# Patient Record
Sex: MALE | Race: BLACK OR AFRICAN AMERICAN | NOT HISPANIC OR LATINO | Employment: UNEMPLOYED | URBAN - METROPOLITAN AREA
[De-identification: names, ages, dates, MRNs, and addresses within clinical notes are randomized per-mention and may not be internally consistent; named-entity substitution may affect disease eponyms.]

---

## 2018-06-19 ENCOUNTER — APPOINTMENT (OUTPATIENT)
Dept: GENERAL RADIOLOGY | Facility: CLINIC | Age: 48
End: 2018-06-19
Attending: EMERGENCY MEDICINE
Payer: COMMERCIAL

## 2018-06-19 ENCOUNTER — HOSPITAL ENCOUNTER (EMERGENCY)
Facility: CLINIC | Age: 48
Discharge: HOME | End: 2018-06-20
Attending: EMERGENCY MEDICINE | Admitting: EMERGENCY MEDICINE
Payer: COMMERCIAL

## 2018-06-19 DIAGNOSIS — R10.13 ABDOMINAL PAIN, EPIGASTRIC: ICD-10-CM

## 2018-06-19 LAB
ALBUMIN UR-MCNC: NEGATIVE MG/DL
AMMONIA PLAS-SCNC: 69 UMOL/L (ref 10–50)
APPEARANCE UR: CLEAR
BASOPHILS # BLD AUTO: 0 10E9/L (ref 0–0.2)
BASOPHILS NFR BLD AUTO: 0.2 %
BILIRUB UR QL STRIP: NEGATIVE
COLOR UR AUTO: ABNORMAL
DIFFERENTIAL METHOD BLD: NORMAL
EOSINOPHIL # BLD AUTO: 0.1 10E9/L (ref 0–0.7)
EOSINOPHIL NFR BLD AUTO: 2.1 %
ERYTHROCYTE [DISTWIDTH] IN BLOOD BY AUTOMATED COUNT: 12.8 % (ref 10–15)
GLUCOSE UR STRIP-MCNC: NEGATIVE MG/DL
HCT VFR BLD AUTO: 40.1 % (ref 40–53)
HGB BLD-MCNC: 13.4 G/DL (ref 13.3–17.7)
HGB UR QL STRIP: NEGATIVE
IMM GRANULOCYTES # BLD: 0 10E9/L (ref 0–0.4)
IMM GRANULOCYTES NFR BLD: 0.2 %
KETONES UR STRIP-MCNC: NEGATIVE MG/DL
LEUKOCYTE ESTERASE UR QL STRIP: NEGATIVE
LYMPHOCYTES # BLD AUTO: 2.3 10E9/L (ref 0.8–5.3)
LYMPHOCYTES NFR BLD AUTO: 41.3 %
MCH RBC QN AUTO: 28.3 PG (ref 26.5–33)
MCHC RBC AUTO-ENTMCNC: 33.4 G/DL (ref 31.5–36.5)
MCV RBC AUTO: 85 FL (ref 78–100)
MONOCYTES # BLD AUTO: 0.4 10E9/L (ref 0–1.3)
MONOCYTES NFR BLD AUTO: 7.6 %
MUCOUS THREADS #/AREA URNS LPF: PRESENT /LPF
NEUTROPHILS # BLD AUTO: 2.8 10E9/L (ref 1.6–8.3)
NEUTROPHILS NFR BLD AUTO: 48.6 %
NITRATE UR QL: NEGATIVE
NRBC # BLD AUTO: 0 10*3/UL
NRBC BLD AUTO-RTO: 0 /100
PH UR STRIP: 6.5 PH (ref 5–7)
PLATELET # BLD AUTO: 179 10E9/L (ref 150–450)
RBC # BLD AUTO: 4.73 10E12/L (ref 4.4–5.9)
RBC #/AREA URNS AUTO: <1 /HPF (ref 0–2)
SOURCE: ABNORMAL
SP GR UR STRIP: 1.01 (ref 1–1.03)
UROBILINOGEN UR STRIP-MCNC: NORMAL MG/DL (ref 0–2)
WBC # BLD AUTO: 5.7 10E9/L (ref 4–11)
WBC #/AREA URNS AUTO: 1 /HPF (ref 0–5)

## 2018-06-19 PROCEDURE — 84443 ASSAY THYROID STIM HORMONE: CPT | Performed by: EMERGENCY MEDICINE

## 2018-06-19 PROCEDURE — 71045 X-RAY EXAM CHEST 1 VIEW: CPT

## 2018-06-19 PROCEDURE — 85025 COMPLETE CBC W/AUTO DIFF WBC: CPT | Performed by: EMERGENCY MEDICINE

## 2018-06-19 PROCEDURE — 80053 COMPREHEN METABOLIC PANEL: CPT | Performed by: EMERGENCY MEDICINE

## 2018-06-19 PROCEDURE — 84484 ASSAY OF TROPONIN QUANT: CPT | Performed by: EMERGENCY MEDICINE

## 2018-06-19 PROCEDURE — 83690 ASSAY OF LIPASE: CPT | Performed by: EMERGENCY MEDICINE

## 2018-06-19 PROCEDURE — 99284 EMERGENCY DEPT VISIT MOD MDM: CPT | Mod: Z6 | Performed by: EMERGENCY MEDICINE

## 2018-06-19 PROCEDURE — 87086 URINE CULTURE/COLONY COUNT: CPT | Performed by: EMERGENCY MEDICINE

## 2018-06-19 PROCEDURE — 99285 EMERGENCY DEPT VISIT HI MDM: CPT | Mod: 25 | Performed by: EMERGENCY MEDICINE

## 2018-06-19 PROCEDURE — 81001 URINALYSIS AUTO W/SCOPE: CPT | Performed by: EMERGENCY MEDICINE

## 2018-06-19 PROCEDURE — 82140 ASSAY OF AMMONIA: CPT | Performed by: EMERGENCY MEDICINE

## 2018-06-19 ASSESSMENT — ENCOUNTER SYMPTOMS
EYE REDNESS: 0
HEADACHES: 0
NAUSEA: 0
SHORTNESS OF BREATH: 0
ABDOMINAL PAIN: 1
DIFFICULTY URINATING: 0
FEVER: 0
VOMITING: 0
ARTHRALGIAS: 0
FATIGUE: 1
CONFUSION: 0
CHILLS: 1
NECK STIFFNESS: 0
DIARRHEA: 1
COLOR CHANGE: 0

## 2018-06-19 NOTE — ED AVS SNAPSHOT
[facility], Emergency Department    [address]    [place] [address]    Phone:  [phone]    Fax:  [phone]                                       Honey Eastman   MRN: [id number]    Department:  [facility], Emergency Department   Date of Visit:  6/19/2018           Patient Information     Date Of Birth          1970        Your diagnoses for this visit were:     None       You were seen by Kirill Santamaria MD.        Discharge Instructions       Please make an appointment to follow up with Your Primary Care Provider in 1-2 days.        Possible Gallstone with Biliary Colic (Presumed)    Your abdominal pain is may be due to spasm of the gallbladder. This is called gallbladder or biliary colic. The gallbladder is a small sac under the liver, which stores and releases bile. Bile is a fluid that aids in the digestion of fat. Eating fatty food stimulates the gallbladder to contract, and release the bile. A gallstone may form in this sac. Although most people do not have symptoms, when the stone moves and blocks the passage of bile out of the bladder, it can cause pain and even an infection.  To be more certain of the diagnosis, you may need to have an ultrasound, CT-scan or other special test.  A number of things increase the risk for developing gallstones:    Women are more likely    Obesity    Increasing age    Losing or gaining weight quickly    High calorie diet    Pregnancy    Hormone therapy    Diabetes  The most common symptoms are:    Abdominal pain, cramping, aching    Nausea, vomiting    Fever  Many illnesses can cause these symptoms. This pain usually starts in the upper right side of your abdomen. Sometimes it can radiate to your right shoulder, back and arm. It usually starts suddenly, becomes more intense quickly, and then gradually decreases and disappears over a couple of hours. Elderly people and diabetics may have difficulty showing where the pain is exactly.  Home  care    Rest in bed and follow a clear liquid diet until feeling better. If pain or nausea medicine was given to help with your symptoms, take these as directed.    You can take acetaminophen or ibuprofen for pain, unless you were given a different pain medicine to use.Note: If you have chronic liver or kidney disease or ever had a stomach ulcer or GI bleeding or are taking blood thinner medications, talk with your doctor before using these medicines.    Fat in your diet makes the gallbladder contract and may cause increased pain. Therefore, avoid fat in your diet over the next two days and follow a low-fat diet after that. If you are overweight, a low fat diet will help you lose weight.  Follow-up care  If a test was already scheduled for you, keep this appointment. Be sure you know how to prepare yourself for the test. Usually, you will be asked not to eat or drink anything for at least 8 hours before the test. Schedule an appointment with your own doctor after your test is complete to discuss the findings.  When to seek medical advice  Call your healthcare provider if any of the following occur:    Pain gets worse or moves to the right lower abdomen    Repeated vomiting    Swelling of the abdomen    Pain lasts over 6 hours    Fever of 100.4  F (38  C) or higher, or as directed by your healthcare provider    Weakness, dizziness    Dark urine or light colored stools    Yellow color of the skin or eyes    Chest, arm, back, neck or jaw pain  Call 911  Call 911 if any of these occur:    Trouble breathing    Very confused    Very drowsy or trouble awakening    Fainting or loss of consciousness    Rapid heart rate  Date Last Reviewed: 8/23/2015 2000-2017 [employer]. [address] [place] [address], [place] [address]. All rights reserved. This information is not intended as a substitute for professional medical care. Always follow your healthcare professional's instructions.          24 Hour Appointment Hotline        To make an appointment at any [facility], call [phone] ([phone]). If you don't have a family doctor or clinic, we will help you find one. [facility] clinics are conveniently located to serve the needs of you and your family.             Review of your medicines      START taking        Dose / Directions Last dose taken    lactulose 20 GM Packet   Commonly known as:  CEPHULAC   Dose:  20 g   Quantity:  30 each        Take 1 packet (20 g) by mouth 2 times daily   Refills:  0          Our records show that you are taking the medicines listed below. If these are incorrect, please call your family doctor or clinic.        Dose / Directions Last dose taken    * hydrochlorothiazide 12.5 MG Tabs tablet   Dose:  12.5 mg   Quantity:  30 tablet        Take 1 tablet by mouth daily.   Refills:  1        * hydrochlorothiazide 12.5 MG capsule   Commonly known as:  MICROZIDE   Dose:  12.5 mg        Take 12.5 mg by mouth daily.   Refills:  0        HYDROcodone-acetaminophen 5-325 MG per tablet   Commonly known as:  NORCO   Dose:  1 tablet        Take 1 tablet by mouth every 6 hours as needed.   Refills:  0        IBUPROFEN        Refills:  0        PERCOCET PO        Take  by mouth.   Refills:  0        * Notice:  This list has 2 medication(s) that are the same as other medications prescribed for you. Read the directions carefully, and ask your doctor or other care provider to review them with you.            Prescriptions were sent or printed at these locations (1 Prescription)                   Other Prescriptions                Printed at Department/Unit printer (1 of 1)         lactulose (CEPHULAC) 20 GM Packet                Procedures and tests performed during your visit     Ammonia    CBC with platelets differential    CT Abdomen Pelvis w Contrast    Comprehensive metabolic panel    Lipase    Peripheral IV catheter    TSH with free T4 reflex    Troponin I    UA with Microscopic    US Abdomen Limited     "Urine Culture    XR Chest Port 1 View      Orders Needing Specimen Collection     Ordered          06/19/18 2337  Clostridium difficile toxin B PCR - STAT, Prio: STAT, Needs to be Collected     Scheduled Task Status   06/19/18 2337 Collect Clostridium difficile toxin B PCR Open   Order Class:  PCU Collect                06/19/18 2337  Enteric Bacteria and Virus Panel by ALONZO Stool - STAT, Prio: STAT, Needs to be Collected     Scheduled Task Status   06/19/18 2338 Collect Enteric Bacteria and Virus Panel by ALONZO Stool Open   Order Class:  PCU Collect                  Pending Results     Date and Time Order Name Status Description    6/19/2018 2337 Urine Culture Preliminary             Pending Culture Results     Date and Time Order Name Status Description    6/19/2018 2337 Urine Culture Preliminary             Pending Results Instructions     If you had any lab results that were not finalized at the time of your Discharge, you can call the ED Lab Result RN at [phone]. You will be contacted by this team for any positive Lab results or changes in treatment. The nurses are available 7 days a week from 10A to 6:30P.  You can leave a message 24 hours per day and they will return your call.        Thank you for choosing [facility]       Thank you for choosing [facility] for your care. Our goal is always to provide you with excellent care. Hearing back from our patients is one way we can continue to improve our services. Please take a few minutes to complete the written survey that you may receive in the mail after you visit with us. Thank you!        [facility] Information     [employer] lets you send messages to your doctor, view your test results, renew your prescriptions, schedule appointments and more. To sign up, go to [email or web address] . Click on \"Log in\" on the left side of the screen, which will take you to the Welcome page. Then click on \"Sign up Now\" on the right side of the page.     You will be asked to enter " the access code listed below, as well as some personal information. Please follow the directions to create your username and password.     Your access code is: [email or web address]  Expires: 2018  2:30 AM     Your access code will  in 90 days. If you need help or a new code, please call your [facility] clinic or [phone].        Care EveryWhere ID     This is your Care EveryWhere ID. This could be used by other organizations to access your [facility] medical records  [id number]        Equal Access to Services     [facility]: Hadwill Taylor, antonio tello, bassam hendricksonalpaola castro, leland talavera . So [facility] [phone].    ATENCIÓN: Si habla español, tiene a schulte disposición servicios gratuitos de asistencia lingüística. Llame al [phone].    We comply with applicable federal civil rights laws and [place] laws. We do not discriminate on the basis of race, color, national origin, age, disability, sex, sexual orientation, or gender identity.            After Visit Summary       This is your record. Keep this with you and show to your community pharmacist(s) and doctor(s) at your next visit.

## 2018-06-19 NOTE — ED AVS SNAPSHOT
[facility], [place], Emergency Department    [address]    [facility] [address]    Phone:  [phone]    Fax:  [phone]                                       Honey Eastman   MRN: [id number]    Department:  [facility], Emergency Department   Date of Visit:  6/19/2018           After Visit Summary Signature Page     I have received my discharge instructions, and my questions have been answered. I have discussed any challenges I see with this plan with the nurse or doctor.    ..........................................................................................................................................  Patient/Patient Representative Signature      ..........................................................................................................................................  Patient Representative Print Name and Relationship to Patient    ..................................................               ................................................  Date                                            Time    ..........................................................................................................................................  Reviewed by Signature/Title    ...................................................              ..............................................  Date                                                            Time

## 2018-06-20 ENCOUNTER — APPOINTMENT (OUTPATIENT)
Dept: ULTRASOUND IMAGING | Facility: CLINIC | Age: 48
End: 2018-06-20
Attending: EMERGENCY MEDICINE
Payer: COMMERCIAL

## 2018-06-20 ENCOUNTER — APPOINTMENT (OUTPATIENT)
Dept: CT IMAGING | Facility: CLINIC | Age: 48
End: 2018-06-20
Attending: EMERGENCY MEDICINE
Payer: COMMERCIAL

## 2018-06-20 VITALS
SYSTOLIC BLOOD PRESSURE: 185 MMHG | TEMPERATURE: 98.3 F | OXYGEN SATURATION: 99 % | DIASTOLIC BLOOD PRESSURE: 119 MMHG | RESPIRATION RATE: 18 BRPM

## 2018-06-20 LAB
ALBUMIN SERPL-MCNC: 3.5 G/DL (ref 3.4–5)
ALP SERPL-CCNC: 96 U/L (ref 40–150)
ALT SERPL W P-5'-P-CCNC: 90 U/L (ref 0–70)
ANION GAP SERPL CALCULATED.3IONS-SCNC: 13 MMOL/L (ref 3–14)
AST SERPL W P-5'-P-CCNC: 127 U/L (ref 0–45)
BILIRUB SERPL-MCNC: 1.5 MG/DL (ref 0.2–1.3)
BUN SERPL-MCNC: 11 MG/DL (ref 7–30)
CALCIUM SERPL-MCNC: 8 MG/DL (ref 8.5–10.1)
CHLORIDE SERPL-SCNC: 105 MMOL/L (ref 94–109)
CO2 SERPL-SCNC: 27 MMOL/L (ref 20–32)
CREAT SERPL-MCNC: 0.85 MG/DL (ref 0.66–1.25)
GFR SERPL CREATININE-BSD FRML MDRD: >90 ML/MIN/1.7M2
GLUCOSE SERPL-MCNC: 103 MG/DL (ref 70–99)
LIPASE SERPL-CCNC: 140 U/L (ref 73–393)
POTASSIUM SERPL-SCNC: 3 MMOL/L (ref 3.4–5.3)
PROT SERPL-MCNC: 7.3 G/DL (ref 6.8–8.8)
SODIUM SERPL-SCNC: 145 MMOL/L (ref 133–144)
TROPONIN I SERPL-MCNC: <0.015 UG/L (ref 0–0.04)
TSH SERPL DL<=0.005 MIU/L-ACNC: 2.06 MU/L (ref 0.4–4)

## 2018-06-20 PROCEDURE — 96360 HYDRATION IV INFUSION INIT: CPT | Performed by: EMERGENCY MEDICINE

## 2018-06-20 PROCEDURE — 74177 CT ABD & PELVIS W/CONTRAST: CPT

## 2018-06-20 PROCEDURE — 25000128 H RX IP 250 OP 636: Performed by: EMERGENCY MEDICINE

## 2018-06-20 PROCEDURE — 25000125 ZZHC RX 250: Performed by: EMERGENCY MEDICINE

## 2018-06-20 PROCEDURE — 25000132 ZZH RX MED GY IP 250 OP 250 PS 637: Performed by: EMERGENCY MEDICINE

## 2018-06-20 PROCEDURE — 76705 ECHO EXAM OF ABDOMEN: CPT

## 2018-06-20 RX ORDER — POTASSIUM CHLORIDE 1.5 G/1.58G
40 POWDER, FOR SOLUTION ORAL ONCE
Status: COMPLETED | OUTPATIENT
Start: 2018-06-20 | End: 2018-06-20

## 2018-06-20 RX ORDER — IOPAMIDOL 755 MG/ML
100 INJECTION, SOLUTION INTRAVASCULAR ONCE
Status: COMPLETED | OUTPATIENT
Start: 2018-06-20 | End: 2018-06-20

## 2018-06-20 RX ADMIN — SODIUM CHLORIDE 1000 ML: 9 INJECTION, SOLUTION INTRAVENOUS at 01:16

## 2018-06-20 RX ADMIN — IOPAMIDOL 91 ML: 755 INJECTION, SOLUTION INTRAVENOUS at 00:46

## 2018-06-20 RX ADMIN — SODIUM CHLORIDE 63 ML: 9 INJECTION, SOLUTION INTRAVENOUS at 00:46

## 2018-06-20 RX ADMIN — POTASSIUM CHLORIDE 40 MEQ: 1.5 POWDER, FOR SOLUTION ORAL at 00:26

## 2018-06-20 NOTE — ED NOTES
Denies suicidal ideation. He has no place to live and wants to sleep here until morning. Will observe in ED and anticipate discharge this morning.     Ivan Best MD  06/20/18 8598

## 2018-06-20 NOTE — DISCHARGE INSTRUCTIONS
Please make an appointment to follow up with Your Primary Care Provider in 1-2 days.        Possible Gallstone with Biliary Colic (Presumed)    Your abdominal pain is may be due to spasm of the gallbladder. This is called gallbladder or biliary colic. The gallbladder is a small sac under the liver, which stores and releases bile. Bile is a fluid that aids in the digestion of fat. Eating fatty food stimulates the gallbladder to contract, and release the bile. A gallstone may form in this sac. Although most people do not have symptoms, when the stone moves and blocks the passage of bile out of the bladder, it can cause pain and even an infection.  To be more certain of the diagnosis, you may need to have an ultrasound, CT-scan or other special test.  A number of things increase the risk for developing gallstones:    Women are more likely    Obesity    Increasing age    Losing or gaining weight quickly    High calorie diet    Pregnancy    Hormone therapy    Diabetes  The most common symptoms are:    Abdominal pain, cramping, aching    Nausea, vomiting    Fever  Many illnesses can cause these symptoms. This pain usually starts in the upper right side of your abdomen. Sometimes it can radiate to your right shoulder, back and arm. It usually starts suddenly, becomes more intense quickly, and then gradually decreases and disappears over a couple of hours. Elderly people and diabetics may have difficulty showing where the pain is exactly.  Home care    Rest in bed and follow a clear liquid diet until feeling better. If pain or nausea medicine was given to help with your symptoms, take these as directed.    You can take acetaminophen or ibuprofen for pain, unless you were given a different pain medicine to use.Note: If you have chronic liver or kidney disease or ever had a stomach ulcer or GI bleeding or are taking blood thinner medications, talk with your doctor before using these medicines.    Fat in your diet makes the  gallbladder contract and may cause increased pain. Therefore, avoid fat in your diet over the next two days and follow a low-fat diet after that. If you are overweight, a low fat diet will help you lose weight.  Follow-up care  If a test was already scheduled for you, keep this appointment. Be sure you know how to prepare yourself for the test. Usually, you will be asked not to eat or drink anything for at least 8 hours before the test. Schedule an appointment with your own doctor after your test is complete to discuss the findings.  When to seek medical advice  Call your healthcare provider if any of the following occur:    Pain gets worse or moves to the right lower abdomen    Repeated vomiting    Swelling of the abdomen    Pain lasts over 6 hours    Fever of 100.4  F (38  C) or higher, or as directed by your healthcare provider    Weakness, dizziness    Dark urine or light colored stools    Yellow color of the skin or eyes    Chest, arm, back, neck or jaw pain  Call 911  Call 911 if any of these occur:    Trouble breathing    Very confused    Very drowsy or trouble awakening    Fainting or loss of consciousness    Rapid heart rate  Date Last Reviewed: 8/23/2015 2000-2017 [employer]. [address] [place] [address], [place] [address]. All rights reserved. This information is not intended as a substitute for professional medical care. Always follow your healthcare professional's instructions.

## 2018-06-20 NOTE — ED NOTES
Patient informed of search and asked to change into scrubs.  Patient agreeable to search but declined wearing scrubs.

## 2018-06-20 NOTE — ED NOTES
Asked to follow up on ultrasound which is reported above. He is to follow up in clinic. Also instructed to follow up on elevated blood pressure at his clinic follow up.     Ivan Best MD  06/20/18 2921

## 2018-06-20 NOTE — ED NOTES
Patient is discharged and patient is stating that he feels suicidal and wants to be evaluated. MD informed.

## 2018-06-20 NOTE — ED PROVIDER NOTES
History     Chief Complaint   Patient presents with     Abdominal Pain     Pt c/o severe abd pain x 3 days. Also c/o diarrhea. H/O pancreatitis     HPI  Honey Eastman is a 47 year old male with history of hypertension, polysubstance abuse, schizophrenia who presents the emergency department today for evaluation of abdominal pain.  The patient reports that over the past 3 days he has been experiencing abdominal pain, diarrhea, and mild generalized fatigue/weakness.  He denies recorded fever, vomiting, or nausea.  He does report some chills.  He describes pain to be diffuse in the entirety of the abdomen and to be constant.  He said the pain is worse with body movements.  He describes bowel movements as nonbloody, yellowish in color, occurring 3-4 times per day.  He denies any dark/black stools.  He specifically reports concern for pancreatitis though denies any history of this. The patient states she has had no prior abdominal surgery.  Denies history of DVT/PE.  He states that she is no recent antibiotics.  The patient admits that he does drink alcohol occasionally and also is a smoker.    No current facility-administered medications for this encounter.      Current Outpatient Prescriptions   Medication     hydrochlorothiazide 12.5 MG TABS     hydrochlorothiazide (MICROZIDE) 12.5 MG capsule     hydrocodone-acetaminophen 5-325 MG per tablet     IBUPROFEN     Oxycodone-Acetaminophen (PERCOCET PO)     Past Medical History:   Diagnosis Date     Anxiety      Depressive disorder      Hypertension      Substance abuse        History reviewed. No pertinent surgical history.    No family history on file.    Social History   Substance Use Topics     Smoking status: Current Every Day Smoker     Packs/day: 1.50     Smokeless tobacco: Never Used     Alcohol use Yes      Comment: couple drinks earlier     Allergies   Allergen Reactions     Lisinopril Swelling     Pollen Extract        I have reviewed the Medications,  Allergies, Past Medical and Surgical History, and Social History in the Epic system.    Review of Systems   Constitutional: Positive for chills and fatigue. Negative for fever.   HENT: Negative for congestion.    Eyes: Negative for redness.   Respiratory: Negative for shortness of breath.    Cardiovascular: Negative for chest pain.   Gastrointestinal: Positive for abdominal pain and diarrhea. Negative for nausea and vomiting.   Genitourinary: Negative for difficulty urinating.   Musculoskeletal: Negative for arthralgias and neck stiffness.   Skin: Negative for color change.   Neurological: Negative for headaches.   Psychiatric/Behavioral: Negative for confusion.       Physical Exam   BP: (!) 175/120  Heart Rate: 86  Temp: 97.7  F (36.5  C)  Resp: 16  SpO2: 95 %      Physical Exam   Constitutional: He is oriented to person, place, and time. He appears well-developed and well-nourished. No distress.   HENT:   Head: Atraumatic.   Mouth/Throat: Oropharynx is clear and moist. No oropharyngeal exudate.   Eyes: Pupils are equal, round, and reactive to light. No scleral icterus.   Cardiovascular: Normal rate, regular rhythm, normal heart sounds and intact distal pulses.    Pulmonary/Chest: Breath sounds normal. No respiratory distress.   Abdominal: Soft. Bowel sounds are normal. There is no tenderness.   Musculoskeletal: He exhibits no edema or tenderness.   Neurological: He is alert and oriented to person, place, and time.   Skin: Skin is warm. No rash noted. He is not diaphoretic.   Nursing note and vitals reviewed.      ED Course     ED Course     Procedures             Critical Care time:  none             Labs Ordered and Resulted from Time of ED Arrival Up to the Time of Departure from the ED - No data to display         Assessments & Plan (with Medical Decision Making)   In summary, this is a 47-year-old gentleman with a history of hypertension, polysubstance abuse, and schizophrenia who comes in to the Emergency  Department for evaluation of abdominal pain and diarrhea for the last 3 days. His presentation is concerning for possible pancreatitis, hepatitis, cholelithiasis, cholecystitis, appendicitis, diverticulitis, or gastroenteritis. IV was established, labs were drawn, reviewed, sent, and documented in Epic, and are remarkable for potassium of 3.0, transaminitis in an alcoholic pattern with ALT of 90 and AST of 127, and ammonia of 169, and otherwise normal labs including a lipase of 140, white count of 5.7, and negative UA. The patient was administered 1 L of IV fluids and was sent to CT for imagining of the abdomen and pelvis, which revealed no acute process, however cholelithiasis was present. Upon repeat assessment the patient is persistently tender throughout the abdomen, however negative Elizabeth s sign. The patient was seen to ultrasound for imagining of the right upper quadrant to rule out cholecystitis and will be signed out to the overnight physician pending imagining results and repeat assessment including PO tolerance, with likely plan to discharge to home with close follow-up with primary-care provider in the next 1-2 days.     This part of the document was transcribed by Ken Hagan for Kirill Santamaria MD.    I have reviewed the nursing notes.    I have reviewed the findings, diagnosis, plan and need for follow up with the patient.    New Prescriptions    No medications on file       Final diagnoses:   Abdominal pain, epigastric     I, Ken Hagan, am serving as a trained medical scribe to document services personally performed by Kirill Santamaria MD, based on the provider's statements to me.      IKirill MD, was physically present and have reviewed and verified the accuracy of this note documented by Ken Hagan.    6/19/2018   [facility], EMERGENCY DEPARTMENT     Kirill Santamaria MD  06/21/18 7498

## 2018-06-20 NOTE — ED NOTES
Tolerating oral fluids well. Ultrasound report notes gallstones without evidence of cholecystitis. Discharge to outpatient follow up as per previous plan.     Ivan Best MD  06/20/18 0707

## 2018-06-21 LAB
BACTERIA SPEC CULT: NO GROWTH
Lab: NORMAL
SPECIMEN SOURCE: NORMAL

## 2023-01-01 ENCOUNTER — HOSPITAL ENCOUNTER (EMERGENCY)
Facility: CLINIC | Age: 53
Discharge: HOME | End: 2023-06-21
Attending: EMERGENCY MEDICINE | Admitting: EMERGENCY MEDICINE
Payer: COMMERCIAL

## 2023-01-01 ENCOUNTER — APPOINTMENT (OUTPATIENT)
Dept: CT IMAGING | Facility: CLINIC | Age: 53
End: 2023-01-01
Attending: EMERGENCY MEDICINE
Payer: COMMERCIAL

## 2023-01-01 ENCOUNTER — HOSPITAL ENCOUNTER (EMERGENCY)
Facility: CLINIC | Age: 53
Discharge: HOME | End: 2023-05-31
Attending: EMERGENCY MEDICINE | Admitting: EMERGENCY MEDICINE
Payer: COMMERCIAL

## 2023-01-01 ENCOUNTER — TELEPHONE (OUTPATIENT)
Dept: BEHAVIORAL HEALTH | Facility: CLINIC | Age: 53
End: 2023-01-01
Payer: COMMERCIAL

## 2023-01-01 ENCOUNTER — APPOINTMENT (OUTPATIENT)
Dept: GENERAL RADIOLOGY | Facility: CLINIC | Age: 53
End: 2023-01-01
Attending: EMERGENCY MEDICINE
Payer: COMMERCIAL

## 2023-01-01 ENCOUNTER — HOSPITAL ENCOUNTER (OUTPATIENT)
Facility: CLINIC | Age: 53
Setting detail: OBSERVATION
Discharge: LEFT BEFORE DISCHARGE | End: 2023-05-05
Attending: EMERGENCY MEDICINE | Admitting: HOSPITALIST
Payer: COMMERCIAL

## 2023-01-01 ENCOUNTER — HOSPITAL ENCOUNTER (EMERGENCY)
Facility: CLINIC | Age: 53
Discharge: HOME | End: 2023-06-02
Attending: EMERGENCY MEDICINE | Admitting: EMERGENCY MEDICINE
Payer: COMMERCIAL

## 2023-01-01 ENCOUNTER — LAB REQUISITION (OUTPATIENT)
Dept: LAB | Facility: CLINIC | Age: 53
End: 2023-01-01
Payer: COMMERCIAL

## 2023-01-01 ENCOUNTER — APPOINTMENT (OUTPATIENT)
Dept: SPEECH THERAPY | Facility: CLINIC | Age: 53
End: 2023-01-01
Attending: HOSPITALIST
Payer: COMMERCIAL

## 2023-01-01 ENCOUNTER — HOSPITAL ENCOUNTER (OUTPATIENT)
Facility: CLINIC | Age: 53
Setting detail: OBSERVATION
Discharge: NURSING, REHAB OR LONG-TERM CARE | End: 2023-04-27
Attending: EMERGENCY MEDICINE | Admitting: NURSE PRACTITIONER
Payer: COMMERCIAL

## 2023-01-01 ENCOUNTER — APPOINTMENT (OUTPATIENT)
Dept: ULTRASOUND IMAGING | Facility: CLINIC | Age: 53
End: 2023-01-01
Attending: EMERGENCY MEDICINE
Payer: COMMERCIAL

## 2023-01-01 VITALS
HEART RATE: 65 BPM | OXYGEN SATURATION: 98 % | WEIGHT: 169.1 LBS | BODY MASS INDEX: 22.9 KG/M2 | SYSTOLIC BLOOD PRESSURE: 151 MMHG | RESPIRATION RATE: 16 BRPM | DIASTOLIC BLOOD PRESSURE: 103 MMHG | TEMPERATURE: 97.8 F | HEIGHT: 72 IN

## 2023-01-01 VITALS
RESPIRATION RATE: 16 BRPM | DIASTOLIC BLOOD PRESSURE: 105 MMHG | OXYGEN SATURATION: 97 % | TEMPERATURE: 97.9 F | HEART RATE: 79 BPM | SYSTOLIC BLOOD PRESSURE: 171 MMHG

## 2023-01-01 VITALS
TEMPERATURE: 97.9 F | HEART RATE: 72 BPM | RESPIRATION RATE: 15 BRPM | SYSTOLIC BLOOD PRESSURE: 131 MMHG | DIASTOLIC BLOOD PRESSURE: 95 MMHG | OXYGEN SATURATION: 97 %

## 2023-01-01 VITALS
DIASTOLIC BLOOD PRESSURE: 105 MMHG | HEART RATE: 83 BPM | OXYGEN SATURATION: 96 % | RESPIRATION RATE: 16 BRPM | TEMPERATURE: 97.6 F | SYSTOLIC BLOOD PRESSURE: 162 MMHG

## 2023-01-01 VITALS
DIASTOLIC BLOOD PRESSURE: 100 MMHG | HEART RATE: 72 BPM | SYSTOLIC BLOOD PRESSURE: 136 MMHG | RESPIRATION RATE: 14 BRPM | TEMPERATURE: 98.3 F | OXYGEN SATURATION: 97 %

## 2023-01-01 DIAGNOSIS — M54.2 NECK PAIN: ICD-10-CM

## 2023-01-01 DIAGNOSIS — F10.920 ALCOHOLIC INTOXICATION WITHOUT COMPLICATION (H): ICD-10-CM

## 2023-01-01 DIAGNOSIS — Z87.820 HISTORY OF TRAUMATIC BRAIN INJURY: ICD-10-CM

## 2023-01-01 DIAGNOSIS — R53.1 RIGHT SIDED WEAKNESS: ICD-10-CM

## 2023-01-01 DIAGNOSIS — Z59.00 HOMELESSNESS: ICD-10-CM

## 2023-01-01 DIAGNOSIS — F20.9 SCHIZOPHRENIA (H): ICD-10-CM

## 2023-01-01 DIAGNOSIS — Z13.228 ENCOUNTER FOR SCREENING FOR OTHER METABOLIC DISORDERS: ICD-10-CM

## 2023-01-01 DIAGNOSIS — R51.9 ACUTE NONINTRACTABLE HEADACHE, UNSPECIFIED HEADACHE TYPE: ICD-10-CM

## 2023-01-01 DIAGNOSIS — S62.514A CLOSED NONDISPLACED FRACTURE OF PROXIMAL PHALANX OF RIGHT THUMB, INITIAL ENCOUNTER: ICD-10-CM

## 2023-01-01 DIAGNOSIS — Z59.00 HOMELESS: ICD-10-CM

## 2023-01-01 DIAGNOSIS — F14.90 COCAINE USE: ICD-10-CM

## 2023-01-01 DIAGNOSIS — R07.9 RIGHT-SIDED CHEST PAIN: ICD-10-CM

## 2023-01-01 DIAGNOSIS — R41.82 ALTERED MENTAL STATUS, UNSPECIFIED ALTERED MENTAL STATUS TYPE: ICD-10-CM

## 2023-01-01 DIAGNOSIS — F10.90 ALCOHOL USE DISORDER: ICD-10-CM

## 2023-01-01 DIAGNOSIS — F10.929 ALCOHOLIC INTOXICATION WITH COMPLICATION (H): ICD-10-CM

## 2023-01-01 DIAGNOSIS — I63.9 CEREBROVASCULAR ACCIDENT (CVA), UNSPECIFIED MECHANISM (H): ICD-10-CM

## 2023-01-01 DIAGNOSIS — L03.115 CELLULITIS OF RIGHT LOWER EXTREMITY: ICD-10-CM

## 2023-01-01 LAB
ALBUMIN SERPL BCG-MCNC: 3.3 G/DL (ref 3.5–5.2)
ALBUMIN SERPL BCG-MCNC: 3.7 G/DL (ref 3.5–5.2)
ALBUMIN SERPL BCG-MCNC: 3.9 G/DL (ref 3.5–5.2)
ALBUMIN SERPL BCG-MCNC: 3.9 G/DL (ref 3.5–5.2)
ALBUMIN SERPL BCG-MCNC: 4.1 G/DL (ref 3.5–5.2)
ALP SERPL-CCNC: 128 U/L (ref 40–129)
ALP SERPL-CCNC: 129 U/L (ref 40–129)
ALP SERPL-CCNC: 135 U/L (ref 40–129)
ALP SERPL-CCNC: 135 U/L (ref 40–129)
ALP SERPL-CCNC: 189 U/L (ref 40–129)
ALT SERPL W P-5'-P-CCNC: 20 U/L (ref 10–50)
ALT SERPL W P-5'-P-CCNC: 20 U/L (ref 10–50)
ALT SERPL W P-5'-P-CCNC: 22 U/L (ref 10–50)
ALT SERPL W P-5'-P-CCNC: 27 U/L (ref 10–50)
ALT SERPL W P-5'-P-CCNC: 6 U/L (ref 0–70)
AMPHET UR-MCNC: 57 NG/ML
AMPHETAMINES UR QL SCN: ABNORMAL
AMPHETAMINES UR QL SCN: NORMAL
ANION GAP SERPL CALCULATED.3IONS-SCNC: 10 MMOL/L (ref 7–15)
ANION GAP SERPL CALCULATED.3IONS-SCNC: 10 MMOL/L (ref 7–15)
ANION GAP SERPL CALCULATED.3IONS-SCNC: 12 MMOL/L (ref 7–15)
ANION GAP SERPL CALCULATED.3IONS-SCNC: 14 MMOL/L (ref 7–15)
ANION GAP SERPL CALCULATED.3IONS-SCNC: 14 MMOL/L (ref 7–15)
AST SERPL W P-5'-P-CCNC: 21 U/L (ref 0–45)
AST SERPL W P-5'-P-CCNC: 30 U/L (ref 10–50)
AST SERPL W P-5'-P-CCNC: 31 U/L (ref 10–50)
AST SERPL W P-5'-P-CCNC: 33 U/L (ref 10–50)
AST SERPL W P-5'-P-CCNC: 50 U/L (ref 10–50)
ATRIAL RATE - MUSE: 70 BPM
ATRIAL RATE - MUSE: 72 BPM
ATRIAL RATE - MUSE: 92 BPM
BARBITURATES UR QL SCN: ABNORMAL
BARBITURATES UR QL SCN: NORMAL
BASOPHILS # BLD AUTO: 0 10E3/UL (ref 0–0.2)
BASOPHILS NFR BLD AUTO: 0 %
BASOPHILS NFR BLD AUTO: 1 %
BENZODIAZ UR QL SCN: ABNORMAL
BENZODIAZ UR QL SCN: NORMAL
BILIRUB DIRECT SERPL-MCNC: <0.2 MG/DL (ref 0–0.3)
BILIRUB SERPL-MCNC: 0.2 MG/DL
BILIRUB SERPL-MCNC: 0.3 MG/DL
BILIRUB SERPL-MCNC: 0.4 MG/DL
BUN SERPL-MCNC: 10.1 MG/DL (ref 6–20)
BUN SERPL-MCNC: 11 MG/DL (ref 6–20)
BUN SERPL-MCNC: 6.7 MG/DL (ref 6–20)
BUN SERPL-MCNC: 8.5 MG/DL (ref 6–20)
BUN SERPL-MCNC: 8.5 MG/DL (ref 6–20)
BZE UR QL SCN: ABNORMAL
BZE UR QL SCN: NORMAL
BZE UR-MCNC: >1000 NG/ML
CALCIUM SERPL-MCNC: 8.2 MG/DL (ref 8.6–10)
CALCIUM SERPL-MCNC: 8.4 MG/DL (ref 8.6–10)
CALCIUM SERPL-MCNC: 8.5 MG/DL (ref 8.6–10)
CALCIUM SERPL-MCNC: 8.6 MG/DL (ref 8.6–10)
CALCIUM SERPL-MCNC: 8.7 MG/DL (ref 8.6–10)
CANNABINOIDS UR QL SCN: ABNORMAL
CANNABINOIDS UR QL SCN: NORMAL
CHLORIDE SERPL-SCNC: 103 MMOL/L (ref 98–107)
CHLORIDE SERPL-SCNC: 103 MMOL/L (ref 98–107)
CHLORIDE SERPL-SCNC: 104 MMOL/L (ref 98–107)
CHLORIDE SERPL-SCNC: 104 MMOL/L (ref 98–107)
CHLORIDE SERPL-SCNC: 105 MMOL/L (ref 98–107)
CREAT SERPL-MCNC: 0.78 MG/DL (ref 0.67–1.17)
CREAT SERPL-MCNC: 0.84 MG/DL (ref 0.67–1.17)
CREAT SERPL-MCNC: 0.85 MG/DL (ref 0.67–1.17)
CREAT SERPL-MCNC: 0.87 MG/DL (ref 0.67–1.17)
CREAT SERPL-MCNC: 0.91 MG/DL (ref 0.67–1.17)
DEPRECATED CALCIDIOL+CALCIFEROL SERPL-MC: 18 UG/L (ref 20–75)
DEPRECATED HCO3 PLAS-SCNC: 24 MMOL/L (ref 22–29)
DEPRECATED HCO3 PLAS-SCNC: 25 MMOL/L (ref 22–29)
DEPRECATED HCO3 PLAS-SCNC: 27 MMOL/L (ref 22–29)
DIASTOLIC BLOOD PRESSURE - MUSE: NORMAL MMHG
EGFRCR SERPLBLD CKD-EPI 2021: >90 ML/MIN/1.73M2
EOSINOPHIL # BLD AUTO: 0 10E3/UL (ref 0–0.7)
EOSINOPHIL # BLD AUTO: 0 10E3/UL (ref 0–0.7)
EOSINOPHIL # BLD AUTO: 0.1 10E3/UL (ref 0–0.7)
EOSINOPHIL # BLD AUTO: 0.1 10E3/UL (ref 0–0.7)
EOSINOPHIL NFR BLD AUTO: 1 %
EOSINOPHIL NFR BLD AUTO: 2 %
ERYTHROCYTE [DISTWIDTH] IN BLOOD BY AUTOMATED COUNT: 13.1 % (ref 10–15)
ERYTHROCYTE [DISTWIDTH] IN BLOOD BY AUTOMATED COUNT: 13.2 % (ref 10–15)
ERYTHROCYTE [DISTWIDTH] IN BLOOD BY AUTOMATED COUNT: 13.2 % (ref 10–15)
ERYTHROCYTE [DISTWIDTH] IN BLOOD BY AUTOMATED COUNT: 13.7 % (ref 10–15)
ERYTHROCYTE [DISTWIDTH] IN BLOOD BY AUTOMATED COUNT: 13.8 % (ref 10–15)
ETHANOL SERPL-MCNC: 0.09 G/DL
ETHANOL SERPL-MCNC: 0.21 G/DL
ETHANOL SERPL-MCNC: 0.3 G/DL
ETHANOL SERPL-MCNC: 0.33 G/DL
ETHANOL SERPL-MCNC: 0.34 G/DL
GFR SERPL CREATININE-BSD FRML MDRD: >90 ML/MIN/1.73M2
GLUCOSE BLDC GLUCOMTR-MCNC: 96 MG/DL (ref 70–99)
GLUCOSE SERPL-MCNC: 102 MG/DL (ref 70–99)
GLUCOSE SERPL-MCNC: 105 MG/DL (ref 70–99)
GLUCOSE SERPL-MCNC: 122 MG/DL (ref 70–99)
GLUCOSE SERPL-MCNC: 68 MG/DL (ref 70–99)
GLUCOSE SERPL-MCNC: 97 MG/DL (ref 70–99)
HCT VFR BLD AUTO: 34.9 % (ref 40–53)
HCT VFR BLD AUTO: 37.3 % (ref 40–53)
HCT VFR BLD AUTO: 38.4 % (ref 40–53)
HCT VFR BLD AUTO: 40.2 % (ref 40–53)
HCT VFR BLD AUTO: 41.5 % (ref 40–53)
HGB BLD-MCNC: 10.7 G/DL (ref 13.3–17.7)
HGB BLD-MCNC: 12.2 G/DL (ref 13.3–17.7)
HGB BLD-MCNC: 12.5 G/DL (ref 13.3–17.7)
HGB BLD-MCNC: 13.1 G/DL (ref 13.3–17.7)
HGB BLD-MCNC: 13.5 G/DL (ref 13.3–17.7)
HOLD SPECIMEN: NORMAL
IMM GRANULOCYTES # BLD: 0 10E3/UL
IMM GRANULOCYTES NFR BLD: 0 %
INTERPRETATION ECG - MUSE: NORMAL
LIPASE SERPL-CCNC: 34 U/L (ref 13–60)
LYMPHOCYTES # BLD AUTO: 1.2 10E3/UL (ref 0.8–5.3)
LYMPHOCYTES # BLD AUTO: 1.5 10E3/UL (ref 0.8–5.3)
LYMPHOCYTES # BLD AUTO: 1.8 10E3/UL (ref 0.8–5.3)
LYMPHOCYTES # BLD AUTO: 2 10E3/UL (ref 0.8–5.3)
LYMPHOCYTES NFR BLD AUTO: 36 %
LYMPHOCYTES NFR BLD AUTO: 47 %
LYMPHOCYTES NFR BLD AUTO: 49 %
LYMPHOCYTES NFR BLD AUTO: 54 %
MAGNESIUM SERPL-MCNC: 1.9 MG/DL (ref 1.7–2.3)
MAGNESIUM SERPL-MCNC: 1.9 MG/DL (ref 1.7–2.3)
MAGNESIUM SERPL-MCNC: 2.2 MG/DL (ref 1.7–2.3)
MCH RBC QN AUTO: 28.8 PG (ref 26.5–33)
MCH RBC QN AUTO: 29.3 PG (ref 26.5–33)
MCH RBC QN AUTO: 29.7 PG (ref 26.5–33)
MCH RBC QN AUTO: 29.7 PG (ref 26.5–33)
MCH RBC QN AUTO: 29.8 PG (ref 26.5–33)
MCHC RBC AUTO-ENTMCNC: 30.7 G/DL (ref 31.5–36.5)
MCHC RBC AUTO-ENTMCNC: 32.5 G/DL (ref 31.5–36.5)
MCHC RBC AUTO-ENTMCNC: 32.6 G/DL (ref 31.5–36.5)
MCHC RBC AUTO-ENTMCNC: 32.6 G/DL (ref 31.5–36.5)
MCHC RBC AUTO-ENTMCNC: 32.7 G/DL (ref 31.5–36.5)
MCV RBC AUTO: 89 FL (ref 78–100)
MCV RBC AUTO: 91 FL (ref 78–100)
MCV RBC AUTO: 91 FL (ref 78–100)
MCV RBC AUTO: 92 FL (ref 78–100)
MCV RBC AUTO: 94 FL (ref 78–100)
MDA UR-MCNC: <200 NG/ML
MDEA UR-MCNC: <200 NG/ML
MDMA UR-MCNC: <200 NG/ML
METHAMPHET UR-MCNC: 814 NG/ML
MONOCYTES # BLD AUTO: 0.2 10E3/UL (ref 0–1.3)
MONOCYTES # BLD AUTO: 0.2 10E3/UL (ref 0–1.3)
MONOCYTES # BLD AUTO: 0.3 10E3/UL (ref 0–1.3)
MONOCYTES # BLD AUTO: 0.3 10E3/UL (ref 0–1.3)
MONOCYTES NFR BLD AUTO: 6 %
MONOCYTES NFR BLD AUTO: 7 %
MONOCYTES NFR BLD AUTO: 7 %
MONOCYTES NFR BLD AUTO: 8 %
NEUTROPHILS # BLD AUTO: 1.3 10E3/UL (ref 1.6–8.3)
NEUTROPHILS # BLD AUTO: 1.4 10E3/UL (ref 1.6–8.3)
NEUTROPHILS # BLD AUTO: 1.6 10E3/UL (ref 1.6–8.3)
NEUTROPHILS # BLD AUTO: 1.7 10E3/UL (ref 1.6–8.3)
NEUTROPHILS NFR BLD AUTO: 37 %
NEUTROPHILS NFR BLD AUTO: 43 %
NEUTROPHILS NFR BLD AUTO: 45 %
NEUTROPHILS NFR BLD AUTO: 53 %
NRBC # BLD AUTO: 0 10E3/UL
NRBC BLD AUTO-RTO: 0 /100
NT-PROBNP SERPL-MCNC: 91 PG/ML (ref 0–900)
OPIATES UR QL SCN: ABNORMAL
OPIATES UR QL SCN: NORMAL
P AXIS - MUSE: 23 DEGREES
P AXIS - MUSE: 46 DEGREES
P AXIS - MUSE: 50 DEGREES
PCP QUAL URINE (ROCHE): ABNORMAL
PCP QUAL URINE (ROCHE): NORMAL
PHENTERMINE UR CFM-MCNC: <200 NG/ML
PLATELET # BLD AUTO: 202 10E3/UL (ref 150–450)
PLATELET # BLD AUTO: 213 10E3/UL (ref 150–450)
PLATELET # BLD AUTO: 220 10E3/UL (ref 150–450)
PLATELET # BLD AUTO: 252 10E3/UL (ref 150–450)
PLATELET # BLD AUTO: 297 10E3/UL (ref 150–450)
POTASSIUM SERPL-SCNC: 3.9 MMOL/L (ref 3.4–5.3)
POTASSIUM SERPL-SCNC: 3.9 MMOL/L (ref 3.4–5.3)
POTASSIUM SERPL-SCNC: 4 MMOL/L (ref 3.4–5.3)
POTASSIUM SERPL-SCNC: 4.1 MMOL/L (ref 3.4–5.3)
POTASSIUM SERPL-SCNC: 4.3 MMOL/L (ref 3.4–5.3)
PR INTERVAL - MUSE: 138 MS
PR INTERVAL - MUSE: 146 MS
PR INTERVAL - MUSE: 150 MS
PROT SERPL-MCNC: 6.3 G/DL (ref 6.4–8.3)
PROT SERPL-MCNC: 6.7 G/DL (ref 6.4–8.3)
PROT SERPL-MCNC: 6.7 G/DL (ref 6.4–8.3)
PROT SERPL-MCNC: 6.9 G/DL (ref 6.4–8.3)
PROT SERPL-MCNC: 7 G/DL (ref 6.4–8.3)
QRS DURATION - MUSE: 100 MS
QRS DURATION - MUSE: 92 MS
QRS DURATION - MUSE: 92 MS
QT - MUSE: 384 MS
QT - MUSE: 386 MS
QT - MUSE: 432 MS
QTC - MUSE: 422 MS
QTC - MUSE: 466 MS
QTC - MUSE: 474 MS
R AXIS - MUSE: -11 DEGREES
R AXIS - MUSE: -12 DEGREES
R AXIS - MUSE: -8 DEGREES
RBC # BLD AUTO: 3.71 10E6/UL (ref 4.4–5.9)
RBC # BLD AUTO: 4.17 10E6/UL (ref 4.4–5.9)
RBC # BLD AUTO: 4.19 10E6/UL (ref 4.4–5.9)
RBC # BLD AUTO: 4.41 10E6/UL (ref 4.4–5.9)
RBC # BLD AUTO: 4.55 10E6/UL (ref 4.4–5.9)
SARS-COV-2 RNA RESP QL NAA+PROBE: NEGATIVE
SODIUM SERPL-SCNC: 139 MMOL/L (ref 136–145)
SODIUM SERPL-SCNC: 140 MMOL/L (ref 136–145)
SODIUM SERPL-SCNC: 141 MMOL/L (ref 136–145)
SODIUM SERPL-SCNC: 141 MMOL/L (ref 136–145)
SODIUM SERPL-SCNC: 142 MMOL/L (ref 136–145)
SYSTOLIC BLOOD PRESSURE - MUSE: NORMAL MMHG
T AXIS - MUSE: 22 DEGREES
T AXIS - MUSE: 31 DEGREES
T AXIS - MUSE: 37 DEGREES
TROPONIN T SERPL HS-MCNC: 14 NG/L
TROPONIN T SERPL HS-MCNC: 17 NG/L
TROPONIN T SERPL HS-MCNC: 17 NG/L
VENTRICULAR RATE- MUSE: 70 BPM
VENTRICULAR RATE- MUSE: 72 BPM
VENTRICULAR RATE- MUSE: 92 BPM
WBC # BLD AUTO: 3.1 10E3/UL (ref 4–11)
WBC # BLD AUTO: 3.3 10E3/UL (ref 4–11)
WBC # BLD AUTO: 3.6 10E3/UL (ref 4–11)
WBC # BLD AUTO: 3.7 10E3/UL (ref 4–11)
WBC # BLD AUTO: 4.3 10E3/UL (ref 4–11)

## 2023-01-01 PROCEDURE — 250N000013 HC RX MED GY IP 250 OP 250 PS 637: Performed by: HOSPITALIST

## 2023-01-01 PROCEDURE — 80053 COMPREHEN METABOLIC PANEL: CPT | Mod: ORL | Performed by: INTERNAL MEDICINE

## 2023-01-01 PROCEDURE — 90791 PSYCH DIAGNOSTIC EVALUATION: CPT

## 2023-01-01 PROCEDURE — 84484 ASSAY OF TROPONIN QUANT: CPT | Performed by: EMERGENCY MEDICINE

## 2023-01-01 PROCEDURE — 99223 1ST HOSP IP/OBS HIGH 75: CPT | Performed by: NURSE PRACTITIONER

## 2023-01-01 PROCEDURE — 99285 EMERGENCY DEPT VISIT HI MDM: CPT | Mod: 25

## 2023-01-01 PROCEDURE — 250N000013 HC RX MED GY IP 250 OP 250 PS 637: Performed by: NURSE PRACTITIONER

## 2023-01-01 PROCEDURE — 71045 X-RAY EXAM CHEST 1 VIEW: CPT

## 2023-01-01 PROCEDURE — 80359 METHYLENEDIOXYAMPHETAMINES: CPT | Performed by: EMERGENCY MEDICINE

## 2023-01-01 PROCEDURE — 85027 COMPLETE CBC AUTOMATED: CPT | Mod: ORL | Performed by: INTERNAL MEDICINE

## 2023-01-01 PROCEDURE — 250N000009 HC RX 250: Performed by: EMERGENCY MEDICINE

## 2023-01-01 PROCEDURE — 99233 SBSQ HOSP IP/OBS HIGH 50: CPT | Performed by: NURSE PRACTITIONER

## 2023-01-01 PROCEDURE — P9604 ONE-WAY ALLOW PRORATED TRIP: HCPCS | Mod: ORL | Performed by: INTERNAL MEDICINE

## 2023-01-01 PROCEDURE — 93005 ELECTROCARDIOGRAM TRACING: CPT

## 2023-01-01 PROCEDURE — 82077 ASSAY SPEC XCP UR&BREATH IA: CPT | Performed by: EMERGENCY MEDICINE

## 2023-01-01 PROCEDURE — 80053 COMPREHEN METABOLIC PANEL: CPT | Performed by: EMERGENCY MEDICINE

## 2023-01-01 PROCEDURE — 72125 CT NECK SPINE W/O DYE: CPT

## 2023-01-01 PROCEDURE — 36415 COLL VENOUS BLD VENIPUNCTURE: CPT | Performed by: EMERGENCY MEDICINE

## 2023-01-01 PROCEDURE — 70498 CT ANGIOGRAPHY NECK: CPT

## 2023-01-01 PROCEDURE — 80353 DRUG SCREENING COCAINE: CPT | Performed by: EMERGENCY MEDICINE

## 2023-01-01 PROCEDURE — 80307 DRUG TEST PRSMV CHEM ANLYZR: CPT | Performed by: EMERGENCY MEDICINE

## 2023-01-01 PROCEDURE — 70496 CT ANGIOGRAPHY HEAD: CPT

## 2023-01-01 PROCEDURE — 250N000013 HC RX MED GY IP 250 OP 250 PS 637: Performed by: EMERGENCY MEDICINE

## 2023-01-01 PROCEDURE — 99239 HOSP IP/OBS DSCHRG MGMT >30: CPT | Performed by: PSYCHIATRY & NEUROLOGY

## 2023-01-01 PROCEDURE — U0005 INFEC AGEN DETEC AMPLI PROBE: HCPCS | Performed by: EMERGENCY MEDICINE

## 2023-01-01 PROCEDURE — 92610 EVALUATE SWALLOWING FUNCTION: CPT | Mod: GN | Performed by: SPEECH-LANGUAGE PATHOLOGIST

## 2023-01-01 PROCEDURE — 96365 THER/PROPH/DIAG IV INF INIT: CPT

## 2023-01-01 PROCEDURE — G0378 HOSPITAL OBSERVATION PER HR: HCPCS

## 2023-01-01 PROCEDURE — 83735 ASSAY OF MAGNESIUM: CPT | Performed by: EMERGENCY MEDICINE

## 2023-01-01 PROCEDURE — 80307 DRUG TEST PRSMV CHEM ANLYZR: CPT | Performed by: NURSE PRACTITIONER

## 2023-01-01 PROCEDURE — 85025 COMPLETE CBC W/AUTO DIFF WBC: CPT | Performed by: EMERGENCY MEDICINE

## 2023-01-01 PROCEDURE — 83880 ASSAY OF NATRIURETIC PEPTIDE: CPT | Performed by: EMERGENCY MEDICINE

## 2023-01-01 PROCEDURE — 83735 ASSAY OF MAGNESIUM: CPT | Mod: ORL | Performed by: INTERNAL MEDICINE

## 2023-01-01 PROCEDURE — 36415 COLL VENOUS BLD VENIPUNCTURE: CPT | Mod: ORL | Performed by: INTERNAL MEDICINE

## 2023-01-01 PROCEDURE — 70450 CT HEAD/BRAIN W/O DYE: CPT

## 2023-01-01 PROCEDURE — 258N000003 HC RX IP 258 OP 636: Performed by: EMERGENCY MEDICINE

## 2023-01-01 PROCEDURE — 82962 GLUCOSE BLOOD TEST: CPT

## 2023-01-01 PROCEDURE — 99222 1ST HOSP IP/OBS MODERATE 55: CPT | Performed by: HOSPITALIST

## 2023-01-01 PROCEDURE — C9803 HOPD COVID-19 SPEC COLLECT: HCPCS

## 2023-01-01 PROCEDURE — 250N000011 HC RX IP 250 OP 636: Performed by: EMERGENCY MEDICINE

## 2023-01-01 PROCEDURE — 83690 ASSAY OF LIPASE: CPT | Performed by: EMERGENCY MEDICINE

## 2023-01-01 PROCEDURE — 93971 EXTREMITY STUDY: CPT | Mod: RT

## 2023-01-01 PROCEDURE — 83735 ASSAY OF MAGNESIUM: CPT | Performed by: NURSE PRACTITIONER

## 2023-01-01 PROCEDURE — 82248 BILIRUBIN DIRECT: CPT | Mod: ORL | Performed by: INTERNAL MEDICINE

## 2023-01-01 PROCEDURE — 99283 EMERGENCY DEPT VISIT LOW MDM: CPT

## 2023-01-01 PROCEDURE — 82306 VITAMIN D 25 HYDROXY: CPT | Mod: ORL | Performed by: INTERNAL MEDICINE

## 2023-01-01 RX ORDER — LEVETIRACETAM 500 MG/1
500 TABLET ORAL
COMMUNITY
Start: 2023-01-01 | End: 2023-01-01

## 2023-01-01 RX ORDER — MULTIVITAMIN,THERAPEUTIC
1 TABLET ORAL ONCE
Status: COMPLETED | OUTPATIENT
Start: 2023-01-01 | End: 2023-01-01

## 2023-01-01 RX ORDER — FOLIC ACID 1 MG/1
1 TABLET ORAL ONCE
Status: COMPLETED | OUTPATIENT
Start: 2023-01-01 | End: 2023-01-01

## 2023-01-01 RX ORDER — DIVALPROEX SODIUM 500 MG/1
500 TABLET, EXTENDED RELEASE ORAL AT BEDTIME
Status: DISCONTINUED | OUTPATIENT
Start: 2023-01-01 | End: 2023-01-01 | Stop reason: HOSPADM

## 2023-01-01 RX ORDER — MAGNESIUM OXIDE 400 MG/1
800 TABLET ORAL ONCE
Status: COMPLETED | OUTPATIENT
Start: 2023-01-01 | End: 2023-01-01

## 2023-01-01 RX ORDER — DIVALPROEX SODIUM 500 MG/1
1 TABLET, EXTENDED RELEASE ORAL AT BEDTIME
COMMUNITY
Start: 2023-01-01 | End: 2023-01-01

## 2023-01-01 RX ORDER — ONDANSETRON 4 MG/1
4 TABLET, ORALLY DISINTEGRATING ORAL EVERY 6 HOURS PRN
Status: DISCONTINUED | OUTPATIENT
Start: 2023-01-01 | End: 2023-01-01 | Stop reason: HOSPADM

## 2023-01-01 RX ORDER — CARVEDILOL 25 MG/1
50 TABLET ORAL DAILY
Qty: 20 TABLET | Refills: 0 | Status: SHIPPED | OUTPATIENT
Start: 2023-01-01

## 2023-01-01 RX ORDER — LEVETIRACETAM 500 MG/1
500 TABLET ORAL 2 TIMES DAILY
Status: DISCONTINUED | OUTPATIENT
Start: 2023-01-01 | End: 2023-01-01 | Stop reason: HOSPADM

## 2023-01-01 RX ORDER — ESCITALOPRAM OXALATE 5 MG/1
5 TABLET ORAL DAILY
Status: DISCONTINUED | OUTPATIENT
Start: 2023-01-01 | End: 2023-01-01

## 2023-01-01 RX ORDER — FLUMAZENIL 0.1 MG/ML
0.2 INJECTION, SOLUTION INTRAVENOUS
Status: DISCONTINUED | OUTPATIENT
Start: 2023-01-01 | End: 2023-01-01 | Stop reason: HOSPADM

## 2023-01-01 RX ORDER — CARVEDILOL 25 MG/1
50 TABLET ORAL
COMMUNITY
Start: 2023-01-01 | End: 2023-01-01

## 2023-01-01 RX ORDER — ONDANSETRON 2 MG/ML
4 INJECTION INTRAMUSCULAR; INTRAVENOUS EVERY 6 HOURS PRN
Status: DISCONTINUED | OUTPATIENT
Start: 2023-01-01 | End: 2023-01-01 | Stop reason: HOSPADM

## 2023-01-01 RX ORDER — VALSARTAN 80 MG/1
80 TABLET ORAL EVERY 12 HOURS SCHEDULED
Status: DISCONTINUED | OUTPATIENT
Start: 2023-01-01 | End: 2023-01-01 | Stop reason: HOSPADM

## 2023-01-01 RX ORDER — ATORVASTATIN CALCIUM 40 MG/1
40 TABLET, FILM COATED ORAL DAILY
Qty: 10 TABLET | Refills: 0 | Status: SHIPPED | OUTPATIENT
Start: 2023-01-01

## 2023-01-01 RX ORDER — DIVALPROEX SODIUM 500 MG/1
500 TABLET, EXTENDED RELEASE ORAL AT BEDTIME
Qty: 10 TABLET | Refills: 0 | Status: SHIPPED | OUTPATIENT
Start: 2023-01-01 | End: 2023-01-01

## 2023-01-01 RX ORDER — MAGNESIUM HYDROXIDE/ALUMINUM HYDROXICE/SIMETHICONE 120; 1200; 1200 MG/30ML; MG/30ML; MG/30ML
15 SUSPENSION ORAL ONCE
Status: COMPLETED | OUTPATIENT
Start: 2023-01-01 | End: 2023-01-01

## 2023-01-01 RX ORDER — MULTIPLE VITAMINS W/ MINERALS TAB 9MG-400MCG
1 TAB ORAL DAILY
Status: DISCONTINUED | OUTPATIENT
Start: 2023-01-01 | End: 2023-01-01 | Stop reason: HOSPADM

## 2023-01-01 RX ORDER — ASPIRIN 325 MG
325 TABLET ORAL DAILY
Status: DISCONTINUED | OUTPATIENT
Start: 2023-01-01 | End: 2023-01-01 | Stop reason: HOSPADM

## 2023-01-01 RX ORDER — RISPERIDONE 1 MG/1
1 TABLET ORAL AT BEDTIME
COMMUNITY
Start: 2023-01-01 | End: 2023-01-01

## 2023-01-01 RX ORDER — IOPAMIDOL 755 MG/ML
75 INJECTION, SOLUTION INTRAVASCULAR ONCE
Status: COMPLETED | OUTPATIENT
Start: 2023-01-01 | End: 2023-01-01

## 2023-01-01 RX ORDER — CARVEDILOL 12.5 MG/1
50 TABLET ORAL 2 TIMES DAILY WITH MEALS
Status: DISCONTINUED | OUTPATIENT
Start: 2023-01-01 | End: 2023-01-01 | Stop reason: HOSPADM

## 2023-01-01 RX ORDER — VALSARTAN 80 MG/1
80 TABLET ORAL 2 TIMES DAILY
COMMUNITY
End: 2023-01-01

## 2023-01-01 RX ORDER — ACETAMINOPHEN 325 MG/10.15ML
650 LIQUID ORAL EVERY 4 HOURS PRN
Status: DISCONTINUED | OUTPATIENT
Start: 2023-01-01 | End: 2023-01-01 | Stop reason: HOSPADM

## 2023-01-01 RX ORDER — ATORVASTATIN CALCIUM 40 MG/1
40 TABLET, FILM COATED ORAL DAILY
Status: DISCONTINUED | OUTPATIENT
Start: 2023-01-01 | End: 2023-01-01 | Stop reason: HOSPADM

## 2023-01-01 RX ORDER — CLONIDINE HYDROCHLORIDE 0.1 MG/1
0.1 TABLET ORAL EVERY 8 HOURS
Status: DISCONTINUED | OUTPATIENT
Start: 2023-01-01 | End: 2023-01-01

## 2023-01-01 RX ORDER — LABETALOL HYDROCHLORIDE 5 MG/ML
10-40 INJECTION, SOLUTION INTRAVENOUS EVERY 10 MIN PRN
Status: DISCONTINUED | OUTPATIENT
Start: 2023-01-01 | End: 2023-01-01 | Stop reason: HOSPADM

## 2023-01-01 RX ORDER — FOLIC ACID 1 MG/1
1 TABLET ORAL DAILY
Status: DISCONTINUED | OUTPATIENT
Start: 2023-01-01 | End: 2023-01-01 | Stop reason: HOSPADM

## 2023-01-01 RX ORDER — DIAZEPAM 5 MG
10 TABLET ORAL EVERY 30 MIN PRN
Status: DISCONTINUED | OUTPATIENT
Start: 2023-01-01 | End: 2023-01-01

## 2023-01-01 RX ORDER — IPRATROPIUM BROMIDE AND ALBUTEROL SULFATE 2.5; .5 MG/3ML; MG/3ML
3 SOLUTION RESPIRATORY (INHALATION)
Status: DISCONTINUED | OUTPATIENT
Start: 2023-01-01 | End: 2023-01-01 | Stop reason: HOSPADM

## 2023-01-01 RX ORDER — CLONIDINE HYDROCHLORIDE 0.1 MG/1
0.1 TABLET ORAL EVERY 8 HOURS
Status: DISCONTINUED | OUTPATIENT
Start: 2023-01-01 | End: 2023-01-01 | Stop reason: HOSPADM

## 2023-01-01 RX ORDER — DIAZEPAM 10 MG/2ML
5-10 INJECTION, SOLUTION INTRAMUSCULAR; INTRAVENOUS EVERY 30 MIN PRN
Status: DISCONTINUED | OUTPATIENT
Start: 2023-01-01 | End: 2023-01-01

## 2023-01-01 RX ORDER — ACETAMINOPHEN 650 MG/1
650 SUPPOSITORY RECTAL EVERY 4 HOURS PRN
Status: DISCONTINUED | OUTPATIENT
Start: 2023-01-01 | End: 2023-01-01 | Stop reason: HOSPADM

## 2023-01-01 RX ORDER — AMOXICILLIN 250 MG
1-2 CAPSULE ORAL 2 TIMES DAILY
Status: DISCONTINUED | OUTPATIENT
Start: 2023-01-01 | End: 2023-01-01 | Stop reason: HOSPADM

## 2023-01-01 RX ORDER — ACETAMINOPHEN 325 MG/1
650 TABLET ORAL EVERY 4 HOURS PRN
Status: DISCONTINUED | OUTPATIENT
Start: 2023-01-01 | End: 2023-01-01 | Stop reason: HOSPADM

## 2023-01-01 RX ORDER — RISPERIDONE 1 MG/1
1 TABLET ORAL AT BEDTIME
Qty: 10 TABLET | Refills: 0 | Status: SHIPPED | OUTPATIENT
Start: 2023-01-01 | End: 2023-01-01

## 2023-01-01 RX ORDER — LEVETIRACETAM 500 MG/1
500 TABLET ORAL 2 TIMES DAILY
Qty: 20 TABLET | Refills: 0 | Status: SHIPPED | OUTPATIENT
Start: 2023-01-01 | End: 2023-01-01

## 2023-01-01 RX ORDER — ATORVASTATIN CALCIUM 40 MG/1
40 TABLET, FILM COATED ORAL DAILY
COMMUNITY
Start: 2023-01-01 | End: 2023-01-01

## 2023-01-01 RX ORDER — RISPERIDONE 0.5 MG/1
1 TABLET ORAL AT BEDTIME
Status: DISCONTINUED | OUTPATIENT
Start: 2023-01-01 | End: 2023-01-01 | Stop reason: HOSPADM

## 2023-01-01 RX ORDER — HYDRALAZINE HYDROCHLORIDE 20 MG/ML
10-20 INJECTION INTRAMUSCULAR; INTRAVENOUS
Status: DISCONTINUED | OUTPATIENT
Start: 2023-01-01 | End: 2023-01-01 | Stop reason: HOSPADM

## 2023-01-01 RX ORDER — ALBUTEROL SULFATE 90 UG/1
2 AEROSOL, METERED RESPIRATORY (INHALATION) EVERY 6 HOURS PRN
Status: DISCONTINUED | OUTPATIENT
Start: 2023-01-01 | End: 2023-01-01 | Stop reason: HOSPADM

## 2023-01-01 RX ORDER — VALSARTAN 80 MG/1
80 TABLET ORAL 2 TIMES DAILY
Qty: 20 TABLET | Refills: 0 | Status: SHIPPED | OUTPATIENT
Start: 2023-01-01 | End: 2023-01-01

## 2023-01-01 RX ORDER — LIDOCAINE 40 MG/G
CREAM TOPICAL
Status: DISCONTINUED | OUTPATIENT
Start: 2023-01-01 | End: 2023-01-01 | Stop reason: HOSPADM

## 2023-01-01 RX ADMIN — Medication 800 MG: at 10:30

## 2023-01-01 RX ADMIN — FOLIC ACID 1 MG: 1 TABLET ORAL at 10:29

## 2023-01-01 RX ADMIN — Medication 800 MG: at 05:04

## 2023-01-01 RX ADMIN — CLONIDINE HYDROCHLORIDE 0.1 MG: 0.1 TABLET ORAL at 22:13

## 2023-01-01 RX ADMIN — LEVETIRACETAM 500 MG: 500 TABLET, FILM COATED ORAL at 20:09

## 2023-01-01 RX ADMIN — CARVEDILOL 50 MG: 12.5 TABLET, FILM COATED ORAL at 18:51

## 2023-01-01 RX ADMIN — IOPAMIDOL 75 ML: 755 INJECTION, SOLUTION INTRAVENOUS at 05:18

## 2023-01-01 RX ADMIN — RISPERIDONE 1 MG: 0.5 TABLET ORAL at 21:41

## 2023-01-01 RX ADMIN — LEVETIRACETAM 500 MG: 500 TABLET, FILM COATED ORAL at 20:22

## 2023-01-01 RX ADMIN — DIVALPROEX SODIUM 500 MG: 500 TABLET, EXTENDED RELEASE ORAL at 21:41

## 2023-01-01 RX ADMIN — RISPERIDONE 1 MG: 0.5 TABLET ORAL at 22:36

## 2023-01-01 RX ADMIN — LEVETIRACETAM 500 MG: 500 TABLET, FILM COATED ORAL at 08:59

## 2023-01-01 RX ADMIN — ASPIRIN 325 MG ORAL TABLET 325 MG: 325 PILL ORAL at 08:59

## 2023-01-01 RX ADMIN — CLONIDINE HYDROCHLORIDE 0.1 MG: 0.1 TABLET ORAL at 08:39

## 2023-01-01 RX ADMIN — LEVETIRACETAM 500 MG: 500 TABLET, FILM COATED ORAL at 08:23

## 2023-01-01 RX ADMIN — MULTIPLE VITAMINS W/ MINERALS TAB 1 TABLET: TAB at 18:15

## 2023-01-01 RX ADMIN — VALSARTAN 80 MG: 80 TABLET, FILM COATED ORAL at 22:25

## 2023-01-01 RX ADMIN — CLONIDINE HYDROCHLORIDE 0.1 MG: 0.1 TABLET ORAL at 08:23

## 2023-01-01 RX ADMIN — THIAMINE HCL TAB 100 MG 100 MG: 100 TAB at 08:33

## 2023-01-01 RX ADMIN — MULTIPLE VITAMINS W/ MINERALS TAB 1 TABLET: TAB at 08:23

## 2023-01-01 RX ADMIN — CARVEDILOL 50 MG: 12.5 TABLET, FILM COATED ORAL at 18:09

## 2023-01-01 RX ADMIN — VALSARTAN 80 MG: 80 TABLET, FILM COATED ORAL at 20:09

## 2023-01-01 RX ADMIN — ASPIRIN 325 MG ORAL TABLET 325 MG: 325 PILL ORAL at 08:22

## 2023-01-01 RX ADMIN — ATORVASTATIN CALCIUM 40 MG: 40 TABLET, FILM COATED ORAL at 08:33

## 2023-01-01 RX ADMIN — DIVALPROEX SODIUM 500 MG: 500 TABLET, EXTENDED RELEASE ORAL at 22:36

## 2023-01-01 RX ADMIN — FOLIC ACID 1 MG: 1 TABLET ORAL at 05:04

## 2023-01-01 RX ADMIN — VALSARTAN 80 MG: 80 TABLET, FILM COATED ORAL at 08:22

## 2023-01-01 RX ADMIN — CLONIDINE HYDROCHLORIDE 0.1 MG: 0.1 TABLET ORAL at 22:25

## 2023-01-01 RX ADMIN — FOLIC ACID 1 MG: 1 TABLET ORAL at 09:01

## 2023-01-01 RX ADMIN — CLONIDINE HYDROCHLORIDE 0.1 MG: 0.1 TABLET ORAL at 14:24

## 2023-01-01 RX ADMIN — THIAMINE HCL TAB 100 MG 100 MG: 100 TAB at 05:04

## 2023-01-01 RX ADMIN — LEVETIRACETAM 500 MG: 500 TABLET, FILM COATED ORAL at 19:54

## 2023-01-01 RX ADMIN — ATORVASTATIN CALCIUM 40 MG: 40 TABLET, FILM COATED ORAL at 09:00

## 2023-01-01 RX ADMIN — ATORVASTATIN CALCIUM 40 MG: 40 TABLET, FILM COATED ORAL at 08:23

## 2023-01-01 RX ADMIN — ATORVASTATIN CALCIUM 40 MG: 40 TABLET, FILM COATED ORAL at 08:39

## 2023-01-01 RX ADMIN — THERA TABS 1 TABLET: TAB at 10:29

## 2023-01-01 RX ADMIN — FOLIC ACID 1 MG: 1 TABLET ORAL at 08:23

## 2023-01-01 RX ADMIN — VALSARTAN 80 MG: 80 TABLET, FILM COATED ORAL at 08:33

## 2023-01-01 RX ADMIN — CLONIDINE HYDROCHLORIDE 0.1 MG: 0.1 TABLET ORAL at 06:40

## 2023-01-01 RX ADMIN — CLONIDINE HYDROCHLORIDE 0.1 MG: 0.1 TABLET ORAL at 15:08

## 2023-01-01 RX ADMIN — FOLIC ACID 1 MG: 1 TABLET ORAL at 08:33

## 2023-01-01 RX ADMIN — CARVEDILOL 50 MG: 12.5 TABLET, FILM COATED ORAL at 08:22

## 2023-01-01 RX ADMIN — FOLIC ACID: 5 INJECTION, SOLUTION INTRAMUSCULAR; INTRAVENOUS; SUBCUTANEOUS at 23:29

## 2023-01-01 RX ADMIN — DIVALPROEX SODIUM 500 MG: 500 TABLET, EXTENDED RELEASE ORAL at 22:25

## 2023-01-01 RX ADMIN — RISPERIDONE 1 MG: 0.5 TABLET ORAL at 22:25

## 2023-01-01 RX ADMIN — CARVEDILOL 50 MG: 12.5 TABLET, FILM COATED ORAL at 08:33

## 2023-01-01 RX ADMIN — LEVETIRACETAM 500 MG: 500 TABLET, FILM COATED ORAL at 08:39

## 2023-01-01 RX ADMIN — THIAMINE HCL TAB 100 MG 100 MG: 100 TAB at 09:00

## 2023-01-01 RX ADMIN — CARVEDILOL 50 MG: 12.5 TABLET, FILM COATED ORAL at 09:00

## 2023-01-01 RX ADMIN — CLONIDINE HYDROCHLORIDE 0.1 MG: 0.1 TABLET ORAL at 14:58

## 2023-01-01 RX ADMIN — LEVETIRACETAM 500 MG: 500 TABLET, FILM COATED ORAL at 08:33

## 2023-01-01 RX ADMIN — ATORVASTATIN CALCIUM 40 MG: 40 TABLET, FILM COATED ORAL at 18:13

## 2023-01-01 RX ADMIN — FOLIC ACID 1 MG: 1 TABLET ORAL at 18:14

## 2023-01-01 RX ADMIN — CARVEDILOL 50 MG: 12.5 TABLET, FILM COATED ORAL at 18:25

## 2023-01-01 RX ADMIN — MULTIPLE VITAMINS W/ MINERALS TAB 1 TABLET: TAB at 09:00

## 2023-01-01 RX ADMIN — CARVEDILOL 50 MG: 12.5 TABLET, FILM COATED ORAL at 08:39

## 2023-01-01 RX ADMIN — CLONIDINE HYDROCHLORIDE 0.1 MG: 0.1 TABLET ORAL at 15:37

## 2023-01-01 RX ADMIN — MULTIPLE VITAMINS W/ MINERALS TAB 1 TABLET: TAB at 08:33

## 2023-01-01 RX ADMIN — SODIUM CHLORIDE 90 ML: 9 INJECTION, SOLUTION INTRAVENOUS at 05:19

## 2023-01-01 RX ADMIN — THIAMINE HCL TAB 100 MG 100 MG: 100 TAB at 08:39

## 2023-01-01 RX ADMIN — VALSARTAN 80 MG: 80 TABLET, FILM COATED ORAL at 20:22

## 2023-01-01 RX ADMIN — THIAMINE HCL TAB 100 MG 100 MG: 100 TAB at 18:13

## 2023-01-01 RX ADMIN — CLONIDINE HYDROCHLORIDE 0.1 MG: 0.1 TABLET ORAL at 22:35

## 2023-01-01 RX ADMIN — VALSARTAN 80 MG: 80 TABLET, FILM COATED ORAL at 20:25

## 2023-01-01 RX ADMIN — THIAMINE HCL TAB 100 MG 100 MG: 100 TAB at 10:29

## 2023-01-01 RX ADMIN — MULTIPLE VITAMINS W/ MINERALS TAB 1 TABLET: TAB at 08:39

## 2023-01-01 RX ADMIN — VALSARTAN 80 MG: 80 TABLET, FILM COATED ORAL at 08:59

## 2023-01-01 RX ADMIN — VALSARTAN 80 MG: 80 TABLET, FILM COATED ORAL at 08:45

## 2023-01-01 RX ADMIN — CLONIDINE HYDROCHLORIDE 0.1 MG: 0.1 TABLET ORAL at 08:33

## 2023-01-01 RX ADMIN — THERA TABS 1 TABLET: TAB at 05:04

## 2023-01-01 RX ADMIN — LEVETIRACETAM 500 MG: 500 TABLET, FILM COATED ORAL at 20:25

## 2023-01-01 RX ADMIN — CLONIDINE HYDROCHLORIDE 0.1 MG: 0.1 TABLET ORAL at 22:20

## 2023-01-01 RX ADMIN — FOLIC ACID 1 MG: 1 TABLET ORAL at 08:39

## 2023-01-01 RX ADMIN — THIAMINE HCL TAB 100 MG 100 MG: 100 TAB at 08:23

## 2023-01-01 RX ADMIN — RISPERIDONE 1 MG: 0.5 TABLET ORAL at 22:13

## 2023-01-01 RX ADMIN — CARVEDILOL 50 MG: 12.5 TABLET, FILM COATED ORAL at 18:35

## 2023-01-01 RX ADMIN — DIVALPROEX SODIUM 500 MG: 500 TABLET, EXTENDED RELEASE ORAL at 22:13

## 2023-01-01 SDOH — ECONOMIC STABILITY - HOUSING INSECURITY: HOMELESSNESS UNSPECIFIED: Z59.00

## 2023-01-01 ASSESSMENT — ACTIVITIES OF DAILY LIVING (ADL)
ADLS_ACUITY_SCORE: 35
HYGIENE/GROOMING: INDEPENDENT
ADLS_ACUITY_SCORE: 35
ADLS_ACUITY_SCORE: 35
ADLS_ACUITY_SCORE: 37
ADLS_ACUITY_SCORE: 35

## 2023-01-01 ASSESSMENT — COLUMBIA-SUICIDE SEVERITY RATING SCALE - C-SSRS
SUICIDE, SINCE LAST CONTACT: NO
4. HAVE YOU HAD THESE THOUGHTS AND HAD SOME INTENTION OF ACTING ON THEM?: NO
6. HAVE YOU EVER DONE ANYTHING, STARTED TO DO ANYTHING, OR PREPARED TO DO ANYTHING TO END YOUR LIFE?: NO
2. HAVE YOU ACTUALLY HAD ANY THOUGHTS OF KILLING YOURSELF?: Y
1. IN THE PAST MONTH, HAVE YOU WISHED YOU WERE DEAD OR WISHED YOU COULD GO TO SLEEP AND NOT WAKE UP?: Y
2. HAVE YOU ACTUALLY HAD ANY THOUGHTS OF KILLING YOURSELF?: YES
1. HAVE YOU WISHED YOU WERE DEAD OR WISHED YOU COULD GO TO SLEEP AND NOT WAKE UP?: YES
1. SINCE LAST CONTACT, HAVE YOU WISHED YOU WERE DEAD OR WISHED YOU COULD GO TO SLEEP AND NOT WAKE UP?: NO
4. HAVE YOU HAD THESE THOUGHTS AND HAD SOME INTENTION OF ACTING ON THEM?: NO
ATTEMPT SINCE LAST CONTACT: NO
TOTAL  NUMBER OF ABORTED OR SELF INTERRUPTED ATTEMPTS SINCE LAST CONTACT: NO
2. HAVE YOU ACTUALLY HAD ANY THOUGHTS OF KILLING YOURSELF?: NO
5. HAVE YOU STARTED TO WORK OUT OR WORKED OUT THE DETAILS OF HOW TO KILL YOURSELF? DO YOU INTEND TO CARRY OUT THIS PLAN?: NO
3. HAVE YOU BEEN THINKING ABOUT HOW YOU MIGHT KILL YOURSELF?: YES
1. IN THE PAST MONTH, HAVE YOU WISHED YOU WERE DEAD OR WISHED YOU COULD GO TO SLEEP AND NOT WAKE UP?: YES
REASONS FOR IDEATION PAST MONTH: DOES NOT APPLY
5. HAVE YOU STARTED TO WORK OUT OR WORKED OUT THE DETAILS OF HOW TO KILL YOURSELF? DO YOU INTEND TO CARRY OUT THIS PLAN?: NO
TOTAL  NUMBER OF INTERRUPTED ATTEMPTS SINCE LAST CONTACT: NO
2. HAVE YOU ACTUALLY HAD ANY THOUGHTS OF KILLING YOURSELF?: YES

## 2023-04-23 PROBLEM — F20.9 SCHIZOPHRENIA (H): Status: ACTIVE | Noted: 2023-01-01
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
## 2023-04-23 PROBLEM — Z87.820 HISTORY OF TRAUMATIC BRAIN INJURY: Status: ACTIVE | Noted: 2023-01-01

## 2023-04-23 PROBLEM — F10.90 ALCOHOL USE DISORDER: Status: ACTIVE | Noted: 2023-01-01

## 2023-04-23 NOTE — ED NOTES
Patient up independently to use bathroom. Asked for food and apple juice which was provided. Patient asked SI/HI questions, pt verbalized no SI/HI at this time. Patient was offered [facility] services due to his responses to SI questions earlier in the evening and patient stated he would like to go to [facility]. COVID swab obtained.
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
## 2023-04-23 NOTE — ED NOTES
IP MH Referral Acuity Rating Score (RARS)    LMHP complete at referral to IP MH, with DEC; and, daily while awaiting IP MH placement. Call score to PPS.    CRITERIA SCORING Total    New 71 HH and Involuntary for IP MH (not adolescent) 0/1   Boarding over 24 hours 0/1   Vulnerable adult at least 55+ with multiple co morbidities; or, Patient age 11 or under 0/1   Suicide ideation without relief of precipitating factors 1/1   Current plan for suicide 1/1   Current plan for homicide 0/1   Imminent risk or actual attempt to seriously harm another without relief of factors precipitating the attempt 1/1   Severe dysfunction in daily living (ex: complete neglect for self care, extreme disruption in vegetative function, extreme deterioration in social interactions) 1/1   Recent (last 2 weeks) or current physical aggression in the ED 0/1   Restraints or seclusion episode in ED 0/1   Verbal aggression, agitation, yelling, etc., while in the ED 1/1   Active psychosis with psychomotor agitation or catatonia 0/1   Need for constant or near constant redirection (from leaving, from others, etc).  0/1   Intrusive or disruptive behaviors 0/1   TOTAL Acuity Total Score: 5

## 2023-04-23 NOTE — ED NOTES
"Alert, oriented. Noted slurred speech. Patient said this is baseline as he had a history of stroke a few years ago.   Patient wears wrist brace on the right.   Reported ongoing headache from a recent fall.   Placed C-collar. Sent patient to CT scan.   Blood pressure (!) 130/95, pulse 87, temperature 97.4  F (36.3  C), temperature source Oral, resp. rate 18, height 1.753 m (5' 9\"), weight 81.6 kg (180 lb), SpO2 96 %.    "
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
## 2023-04-23 NOTE — ED PROVIDER NOTES
[facility] - Initial Psychiatric Observation Note  [facility] Emergency Department  Observation Initiation Date: Apr 22, 2023    Honey Eastman MRN: [id number]   Age: 52 year old YOB: 1970     History     Chief Complaint   Patient presents with     Alcohol Intoxication     HPI  Honey Eastman is a 52 year old male with a past history notable for schizophrenia, polysubstance use (alcohol, cannabis, amphetamines, crack cocaine), hx of CVA, hx of TBI, hx of seizures. Patient presented to the emergency department for evaluation of alcohol intoxication after being found at a bus station after he had apparently fallen. BAL on arrival was 0.33. Patient has over 19 ED visits over the last month. In the ED, patient complaining of auditory hallucinations with voices telling him to harm himself and patient endorsed a plan to jump off the [place] [address]. Patient was medically evaluated in the emergency department and after a period of observation, was found to be [facility].     [facility], patient overall calm and cooperative. He reports feeling increasingly depressed recently due to losing his housing about 3 weeks ago. He has been homeless since that time. Sleep is restless at times. Appetite is variable. Reports he also left his medications on the light rail at some point over the past week so has not had access to his medications. Reports taking multiple medications for his heart, as well as for seizure prophylaxis. Patient endorses a history of stroke and seizures. He reports experiencing auditory hallucinations commanding him to end his life. He did develop a plan to jump off the [address], and reports he did visit the bridge recently but decided not to jump. Denies visual hallucinations. Did not elicit paranoia or delusional beliefs. Patient denies history of suicide attempts. He reports he has been drinking daily  "recently, denies alcohol-withdrawal seizures or delirium tremens. Patient does not currently feel safe to discharge from the hospital. He is agreeable to restarting his home medications. He is agreeable to transfer to inpatient psychiatry for ongoing safety and stabilization.     Past Medical History  Past Medical History:   Diagnosis Date     Anxiety      Depressive disorder      Hypertension      Substance abuse      No past surgical history on file.  atorvastatin (LIPITOR) 40 MG tablet  carvedilol (COREG) 25 MG tablet  divalproex sodium extended-release (DEPAKOTE ER) 500 MG 24 hr tablet  hydrochlorothiazide (MICROZIDE) 12.5 MG capsule  hydrochlorothiazide 12.5 MG TABS  hydrocodone-acetaminophen 5-325 MG per tablet  IBUPROFEN  lactulose (CEPHULAC) 20 GM Packet  levETIRAcetam (KEPPRA) 500 MG tablet  melatonin 1 MG TABS tablet  Oxycodone-Acetaminophen (PERCOCET PO)  risperiDONE (RISPERDAL) 1 MG tablet      Allergies   Allergen Reactions     Lisinopril Swelling     Pollen Extract      Family History  No family history on file.  Social History   Social History     Tobacco Use     Smoking status: Every Day     Packs/day: 1.50     Types: Cigarettes     Smokeless tobacco: Never   Substance Use Topics     Alcohol use: Yes     Comment: couple drinks earlier     Drug use: No     Comment: crack          Review of Systems  A medically appropriate review of systems was performed with pertinent positives and negatives noted in the HPI, and all other systems negative.    Physical Examination   BP: (!) 139/92  Pulse: 98  Temp: 97.4  F (36.3  C)  Resp: 18  Height: 175.3 cm (5' 9\")  Weight: 81.6 kg (180 lb)  SpO2: 97 %    Physical Exam  General: Appears stated age.   Neuro: Alert and fully oriented. Extremities appear to demonstrate normal strength on visual inspection.   Integumentary/Skin: no rash visualized, normal color    Psychiatric Examination   Appearance: awake, alert, adequately groomed, dressed in hospital scrubs and " appeared as age stated  Attitude:  cooperative  Eye Contact:  fair  Mood:  depressed  Affect:  mood congruent and intensity is blunted  Speech:  dysarthria and normal prosody  Psychomotor Behavior:  no evidence of tardive dyskinesia, dystonia, or tics  Thought Process:  linear and goal oriented  Associations:  no loose associations  Thought Content:  patient endorsing command auditory hallucinations telling him to end his life. Endorsing ongoing suicidal thinking with thoughts of jumping off a bridge. Denies homicidal ideation. Denies visual hallucinations. Did not elicit delusions or paranoia.   Insight:  fair  Judgement:  fair  Oriented to:  time, person, and place  Attention Span and Concentration:  fair  Recent and Remote Memory:  fair  Language: able to name/identify objects without impairment  Fund of Knowledge: intact with awareness of current and past events    ED Course        Labs Ordered and Resulted from Time of ED Arrival to Time of ED Departure   COMPREHENSIVE METABOLIC PANEL - Abnormal       Result Value    Sodium 139      Potassium 3.9      Chloride 105      Carbon Dioxide (CO2) 24      Anion Gap 10      Urea Nitrogen 6.7      Creatinine 0.85      Calcium 8.4 (*)     Glucose 97      Alkaline Phosphatase 135 (*)     AST 30      ALT 22      Protein Total 6.7      Albumin 3.9      Bilirubin Total 0.2      GFR Estimate >90     ETHYL ALCOHOL LEVEL - Abnormal    Alcohol ethyl 0.33 (*)    CBC WITH PLATELETS AND DIFFERENTIAL - Abnormal    WBC Count 3.1 (*)     RBC Count 4.55      Hemoglobin 13.5      Hematocrit 41.5      MCV 91      MCH 29.7      MCHC 32.5      RDW 13.8      Platelet Count 213      % Neutrophils 45      % Lymphocytes 47      % Monocytes 7      % Eosinophils 1      % Basophils 0      % Immature Granulocytes 0      NRBCs per 100 WBC 0      Absolute Neutrophils 1.4 (*)     Absolute Lymphocytes 1.5      Absolute Monocytes 0.2      Absolute Eosinophils 0.0      Absolute Basophils 0.0       Absolute Immature Granulocytes 0.0      Absolute NRBCs 0.0     COVID-19 VIRUS (CORONAVIRUS) BY PCR - Normal    SARS CoV2 PCR Negative     MAGNESIUM - Normal    Magnesium 1.9         Assessments & Plan (with Medical Decision Making)   Patient presenting with alcohol intoxication. He is also endorsing command auditory hallucinations of a voice telling him to end his life. Had a plan to jump off the [place]. Hx of schizophrenia, TBI, seizures, stroke. Has not taken medications for several days as he left his backpack on the lightrail. Nursing notes reviewed noting no acute issues.     I have reviewed the assessment completed by the [facility].     During the observation period, the patient did not require medications for agitation, and did not require restraints/seclusion for patient and/or provider safety.     The patient was found to have a psychiatric condition that would benefit from an observation stay in the emergency department for further psychiatric stabilization and/or coordination of a safe disposition. The observation plan includes serial assessments of psychiatric condition, potential administration of medications if indicated, further disposition pending the patient's psychiatric course during the monitoring period.     Preliminary diagnosis:    ICD-10-CM    1. Fall, initial encounter  W19.XXXA       2. Schizophrenia (H)  F20.9       3. History of traumatic brain injury  Z87.820       4. Alcohol use disorder  F10.90            Treatment Plan:  - Patient will be referred for inpatient psychiatric hospitalization for safety and stabilization. Patient to be registered to observation status while awaiting an inpatient psychiatric bed.   - Patient reports he has court tomorrow at 1:30 PM. He requests that we contact his  Monday morning to let him know that patient is in the hospital - [facility].   - Will restart risperidone 1 mg at bedtime for symptoms of  psychosis  - Resume PTA medications for seizure prophylaxis: Keppra 500 mg bid and Depakote  mg at bedtime  - Resume remainder of PTA medications for cardiac issues  - Will order CIWA with symptom-triggered diazepam due to reported daily alcohol use.  - Urine drug screen, magnesium add-on    --  Lucio Kapoor CNP   [facility] EMERGENCY DEPT  [facility] Unit  4/23/2023        Lucio Kapoor CNP  04/23/23 [id number]

## 2023-04-23 NOTE — ED NOTES
"Patient was found soaked in his urine. Noted restlessness and agitation during cares but was redirectable.   Clothes washed in the laundry.   Blood pressure (!) 143/108, pulse 87, temperature 97.4  F (36.3  C), temperature source Oral, resp. rate 16, height 1.753 m (5' 9\"), weight 81.6 kg (180 lb), SpO2 98 %.    "

## 2023-04-23 NOTE — ED NOTES
Patient is able to move all 4 extremities without complaints.   Consumed oatmeal and boxed dinner.   Removed C-collar.   Patient is unable to maintain oximeter and kept removing it.  Breathing well on room air.   Administered IV fluids.

## 2023-04-23 NOTE — ED NOTES
This writer placed pt on the inpatient list per request of attending provider.  Admission to Inpatient Level of Care is indicated due to:  1. Patient risk of severity of behavioral health disorder is appropriate to proposed level of care as indicated by:    Imminent Risk of Harm: Current plan for suicide or serious harm to self is present  And/or:  Behavioral health disorder is present and appropriate for inpatient care with both of the following:     Severe psychiatric, behavioral or other comorbid conditions are appropriate for management at inpatient mental health as indicated by at least one of the following:   o Depressive symptoms, Comorbid substance use disorder and Internalizing symptoms (sulking, dysphoria, anhedonia)     Severe dysfunction in daily living is present as indicated by at least one of the following:   o Incapacitation because of grave disability  2. Inpatient mental health services are necessary to meet patient needs and at least one of the following:  Specific condition related to admission diagnosis is present and judged likely to deteriorate in absence of treatment at proposed level of care  3. Situation and expectations are appropriate for inpatient care, as indicated by one of the following:   Around-the-clock medical and nursing care to address symptoms and initiate intervention is required  Disposition    Recommended disposition: Inpatient Mental Health       Reviewed case and recommendations with attending provider. Attending Name: Lucio Kapoor CNP       Attending concurs with disposition: Yes       Patient and/or validated legal guardian concurs with disposition: Yes       Final disposition: Inpatient mental health .     Inpatient Details (if applicable):   Is patient admitted voluntarily:Yes      Patient aware of potential for transfer if there is not appropriate placement? Yes       Patient is willing to travel outside of the [facility] for placement? No      Behavioral Intake  Notified? Yes: Date: 04/23/23 Time: 5:53 PM.     CRISTOPHER Gaxiola

## 2023-04-23 NOTE — PROGRESS NOTES
"52 year old male with history of depression received from ED due to SI w plan to jump Reports hearing voices that tell him to kill himself. Endorses SI. Nursing and risk assessments completed. Assessments reviewed with [facility] and physician. Admission information reviewed with patient. Patient given a tour of [facility] and instructions on using the facility. Questions regarding [facility] addressed. Pt safety search completed.     Patient states he has been increasingly depressed since he lost his place to live 3 weeks ago. States he had been staying with a friend but that friend got evicted so he lost his place at that time. He states he hears voices that tell him to\" kill himself as life is not worth it\". He states he has thoughts of suicide and had a plan to jump off a bridge.  Denies previous SI attempts. Denies SIB.   "

## 2023-04-23 NOTE — ED PROVIDER NOTES
History     Chief Complaint:  Alcohol Intoxication       HPI   Honey Eastman is a 52 year old male with a history of alcohol dependence and abuse who is brought to the emergency department [place] beer bottles all around him and apparently intoxicated.  The patient had apparently fallen.  Bystanders attempted to help him stand but then he fell again.  EMS transported him to the emergency department.  He reports that he has a headache.     History and review of systems limited by patient's intoxication.    Independent Historian:   None - Patient Only    Review of External Notes: Reviewed emergency department visit notes on 4/7/2023, 4/15/2023, 4/21/2023.  Patient has a frequent history of emergency department visits to various departments around the [facility].  Also a history of alcohol intoxication and dependence.    ROS:  Review of Systems    Allergies:  Lisinopril  Pollen Extract     Medications:    hydrochlorothiazide (MICROZIDE) 12.5 MG capsule  hydrochlorothiazide 12.5 MG TABS  hydrocodone-acetaminophen 5-325 MG per tablet  IBUPROFEN  lactulose (CEPHULAC) 20 GM Packet  Oxycodone-Acetaminophen (PERCOCET PO)        Past Medical History:    Past Medical History:   Diagnosis Date     Anxiety      Depressive disorder      Hypertension      Substance abuse        Past Surgical History:    No past surgical history on file.     Family History:    family history is not on file.    Social History:   reports that he has been smoking. He has been smoking an average of 1.5 packs per day. He has never used smokeless tobacco. He reports current alcohol use. He reports that he does not use drugs.  PCP: No Ref-Primary, Physician     Physical Exam     Patient Vitals for the past 24 hrs:   BP Temp Temp src Pulse Resp SpO2 Height Weight   04/23/23 0045 (!) 143/108 -- -- -- 16 98 % -- --   04/22/23 2223 -- -- -- -- -- 96 % -- --   04/22/23 2222 (!) 130/95 -- -- 87 -- -- -- --   04/22/23  "2052 (!) 139/92 97.4  F (36.3  C) Oral 98 18 97 % 1.753 m (5' 9\") 81.6 kg (180 lb)        Physical Exam  General: Well-nourished, no acute distress  Eyes: PERRL, conjunctivae pink no scleral icterus or conjunctival injection  ENT:  Moist mucus membranes, posterior oropharynx clear without erythema or exudates, no hemotympanum  Respiratory:  Lungs clear to auscultation bilaterally, no crackles/rubs/wheezes.  Good air movement.  No seatbelt sign or ecchymoses  CV: Normal rate and rhythm, no murmurs/rubs/gallops  GI:  Abdomen soft and non-distended.  Normoactive BS.  No tenderness, guarding or rebound  Skin: Warm, dry.  No rashes or petechiae.  Abrasions over the scalp.  Musculoskeletal: No peripheral edema or calf tenderness.  No midline tenderness of the cervical/thoracic/lumbar spine.  No tenderness/crepitus/bony stepoffs over the clavicles, chest wall, pelvis, arms or legs.  Neuro: Alert and oriented to person but not to place or time.  No apparent cranial nerve deficits.  Psychiatric: Very intoxicated affect      Emergency Department Course     Imaging:  CT Cervical Spine w/o Contrast   Final Result   IMPRESSION:   HEAD CT:   No acute intracranial process.      CERVICAL SPINE CT:   1.  No CT evidence for acute fracture or post traumatic subluxation.   2.  Straightening of the usual cervical lordosis.         CT Head w/o Contrast   Final Result   IMPRESSION:   HEAD CT:   No acute intracranial process.      CERVICAL SPINE CT:   1.  No CT evidence for acute fracture or post traumatic subluxation.   2.  Straightening of the usual cervical lordosis.            Report per radiology    Laboratory:  Labs Ordered and Resulted from Time of ED Arrival to Time of ED Departure   COMPREHENSIVE METABOLIC PANEL - Abnormal       Result Value    Sodium 139      Potassium 3.9      Chloride 105      Carbon Dioxide (CO2) 24      Anion Gap 10      Urea Nitrogen 6.7      Creatinine 0.85      Calcium 8.4 (*)     Glucose 97      Alkaline " Phosphatase 135 (*)     AST 30      ALT 22      Protein Total 6.7      Albumin 3.9      Bilirubin Total 0.2      GFR Estimate >90     ETHYL ALCOHOL LEVEL - Abnormal    Alcohol ethyl 0.33 (*)    CBC WITH PLATELETS AND DIFFERENTIAL - Abnormal    WBC Count 3.1 (*)     RBC Count 4.55      Hemoglobin 13.5      Hematocrit 41.5      MCV 91      MCH 29.7      MCHC 32.5      RDW 13.8      Platelet Count 213      % Neutrophils 45      % Lymphocytes 47      % Monocytes 7      % Eosinophils 1      % Basophils 0      % Immature Granulocytes 0      NRBCs per 100 WBC 0      Absolute Neutrophils 1.4 (*)     Absolute Lymphocytes 1.5      Absolute Monocytes 0.2      Absolute Eosinophils 0.0      Absolute Basophils 0.0      Absolute Immature Granulocytes 0.0      Absolute NRBCs 0.0        Emergency Department Course & Assessments:    PSS-3    Date and Time Over the past 2 weeks have you felt down, depressed, or hopeless? Over the past 2 weeks have you had thoughts of killing yourself? Have you ever attempted to kill yourself? When did this last happen? User   04/22/23 2055 yes yes no -- ABRAMS      C-SSRS (Home)    Date and Time Q1 Wished to be Dead (Past Month) Q2 Suicidal Thoughts (Past Month) Q3 Suicidal Thought Method Q4 Suicidal Intent without Specific Plan Q5 Suicide Intent with Specific Plan Q6 Suicide Behavior (Lifetime) Within the Past 3 Months? RETIRED: Level of Risk per Screen Screening Not Complete User   04/22/23 2055 yes yes no yes no no -- -- -- ABRAMS              Suicide assessment completed by mental health (D.E.C., LCSW, etc.)    Interventions:  Medications   sodium chloride 0.9 % 1,000 mL with Infuvite Adult 10 mL, thiamine 100 mg, folic acid 1 mg infusion ( Intravenous Stopped 4/23/23 0023)        Assessments:  I evaluated and assessed patient.  Patient continues to be intoxicated.    Independent Interpretation (X-rays, CTs, rhythm strip):  I reviewed and interpreted the head CT.  I do not see any evidence of  intracranial hemorrhage.    Consultations/Discussion of Management or Tests:  DEC was consulted for positive suicide screen.  DEC assessment pending clinical sobriety.       Social Determinants of Health affecting care:   None, Homelessness/Housing Insecurity and Stress/Adjustment Disorders    Disposition:  Care of the patient was transferred to my colleague Dr. Diaz pending reassessment after clinical sobriety.    Impression & Plan      Medical Decision Making:  Honey Eastman is a 52 year old male who was found intoxicated in public.  There was report of falls and he has abrasions over his head.  Head CT and CT of the cervical spine were thus obtained and fortunately showed no evidence of intracranial hemorrhage or cervical spine fracture.  Laboratory studies are consistent with chronic alcohol use.  He is quite intoxicated by blood work as well.  He was treated with vitamin containing IV fluids.  He screened positive on the suicide screening scale and so he will need a psychiatric assessment or physician reassessment when clinically sober.  At this time, he remains in our emergency department under observation until clinical sobriety is achieved.  I signed out to my partner Dr. Diaz pending clinical sobriety and reassessment.    Diagnosis:    ICD-10-CM    1. Alcoholic intoxication with complication (H)  F10.929       2. Fall, initial encounter  W19.XXXA            Discharge Medications:  New Prescriptions    No medications on file          Kylah Reid MD  4/23/2023   Kylah Reid MD Cho, Amy C, MD  04/23/23 0153

## 2023-04-23 NOTE — DISCHARGE INSTRUCTIONS
Your court appearance was rescheduled for   Monday 5/15/23 at 8:30am at [facility]  [phone]   [address] [place] [address]    You have a shelter bed at [facility] ([facility] tonight with a 9:00pm check-in   Please coordinate with staff when you check in about how to reserve your bed for the following night.   Always ask about availability on the special needs/30-day floor.     Primary Care follow up appointment  Tuesday, May 12 at 2:30p  Internal Medicine- [facility]  [address] [address]  Appointments: [phone]    For medication management, therapy, CADI, and medical insurance,   go the [facility]  [address] [place] [address]  Phone: [phone]    [employer] has 3 locations  Yara Cast - [place]; Ruth Palacios - [place]; Daniel Moreno  Call their Central Intake at [phone], follow prompts for Crisis Referral and self-referral.    [facility] - [place]  Call [phone]    [facility] - [place]  Call [phone]    [facility]  Call [phone]

## 2023-04-23 NOTE — ED NOTES
IP MH Referral Acuity Rating Score (RARS)    LMHP complete at referral to IP MH, with DEC; and, daily while awaiting IP MH placement. Call score to PPS.    CRITERIA SCORING Total    New 72 HH and Involuntary for IP MH (not adolescent) 0/1   Boarding over 24 hours 0/1   Vulnerable adult at least 55+ with multiple co morbidities; or, Patient age 11 or under 0/1   Suicide ideation without relief of precipitating factors 1/1   Current plan for suicide 1/1   Current plan for homicide 0/1   Imminent risk or actual attempt to seriously harm another without relief of factors precipitating the attempt 0/1   Severe dysfunction in daily living (ex: complete neglect for self care, extreme disruption in vegetative function, extreme deterioration in social interactions) 1/1   Recent (last 2 weeks) or current physical aggression in the ED 0/1   Restraints or seclusion episode in ED 0/1   Verbal aggression, agitation, yelling, etc., while in the ED 0/1   Active psychosis with psychomotor agitation or catatonia 0/1   Need for constant or near constant redirection (from leaving, from others, etc).  0/1   Intrusive or disruptive behaviors 0/1   TOTAL Acuity Total Score: 3     CRISTOPHER Gaxiola

## 2023-04-23 NOTE — PLAN OF CARE
Honey Eastman  April 23, 2023  Plan of Care Hand-off Note     Patient Care Path: Inpatient Mental Health    Plan for Care:   52 year old male with hx of Schizophrenia and polysubstance abuse presents to ED after being found intoxicated and had fallen.   Upon interview, pt endorses suicidal ideation with plan to jump off [address] bridge.  Pt endorses auditory command hallucinations.  Pt is homeless and reports since living back on the streets his depression has worsened.  In addition, pt notes he has lost his medication.  Pt has 19+ ED visits since March.  Pt is recommend for inpatient mental health at this time due to suicide plan and endorsement of command hallucinations.  Pt unable or unwilling to engage in safety planning, denies supports or coping strategies.  Pt currently in [facility] and psychiatry consult pending.  If no changes result in psychiatry consult, pt recommended for inpatient.  Central Intake has not been contacted, pt is not on waitlist at this time.     Critical Safety Issues: suicidal ideation with plan, substance abuse, hx of attempt    Overview:  This patient is a child/adolescent: No    This patient has additional special visitor precautions: No    Legal Status: Voluntary, but holdable due to suicide risk    Contacts:   none,  : Contact      Psychiatry Consult:  Adult Psychiatry Consult requested related to pt reports medication left on bus. Psychiatry IP Consult Order Placed: Yes  Per [facility]  Updated Psych Associate and  regarding plan of care.    Lawanda Oshea

## 2023-04-23 NOTE — TELEPHONE ENCOUNTER
S: JEWEL Bernardo  Jaclyn  calling at 608pm about a 52 year old/Male presenting with SI w plan        B: Pt arrived via Self . Presenting problem, stressors: pt became homeless 3 weeks ago, increasing SI since then     Pt affect in ED: Labile  Pt Dx: Schizophrenia and Substance Use Disorder: Alcohol  Previous IPMH hx? Yes: no details   Pt endorses SI with a plan to jump    Hx of suicide attempt? Yes, no information about them   Pt denies SIB  Pt denies HI   Pt endorses auditory hallucinations . Command in nature, telling him to kill himself and his life is meaningless   Pt RARS Score:     Hx of aggression/violence, sexual offenses, legal concerns, Epic care plan? describe: None  Current concerns for aggression this visit? No  Does pt have a history of Civil Commitment? Yes, most recent commitment unknown  Is Pt their own guardian? Yes    Pt is prescribed medication. Is patient medication compliant? No - pt left them on the bus and hasn't been taking them the last couple days   Pt denies OP services   CD concerns: Actively using/consuming alcohol - pt has been on CIWA protocol in the ED. Pt has been drinking daily.   Acute or chronic medical concerns: None  Does Pt present with specific needs, assistive devices, or exclusionary criteria? None    Pt is ambulatory  Pt is able to perform ADLs independently    A: Pt to be reviewed for [facility] admission. Pt is Voluntary  Preferred placement: [facility]    COVID:Negative  Utox: Ordered, not yet collected   CMP: Abnormalities: Calcium low @8.4, Alkaline Phosphatase high @135  CBC: Abnormalities: WBC Count low @3.1, Absolute Neutrophils low @1.4  HCG: N/A    R: Patient cleared and ready for behavioral bed placement: Yes  Pt placed on IP worklist? Yes

## 2023-04-24 NOTE — PROGRESS NOTES
"Triage & Transition Services [facility]     Progress Note    Patient: Honey goes by \"Honey,\" uses he/him pronouns  Date of Service: April 24, 2023  Site of Service:   Patient was seen in-person.     Presenting problem:  Please see initial DEC/[facility] Crisis Assessment completed by Lawanda Oshea on 4/23/23 for complete assessment information. Notable concerns include alcohol intoxication, depression, command hallucinations to jump off bridge.     Individuals Present: Honey & Caridad Paul, [facility]    Session start: 11:27am  Session end: 111:50am  Session duration in minutes: 23min + consultation with care team  CPT utilized: 26666 - Psychotherapy (with patient) - 30 (16-37*) min    Current Presentation:   Honey continues to present with sx of depression, hopelessness, low mood, and reporting command hallucinations telling him to kill himself. He has been calm and cooperative on unit. He continues to remain appropriate for inpt psych admission. Honey reported he has court today with [facility] and requested help notifying court that he is in ED. See case management notes below for details. He was very appreciative of assistance with this.     Additional Collateral Information:  n/a     Mental Status Exam     Affect: Appropriate   Appearance: Disheveled    Attention Span/Concentration: Attentive  Eye Contact: Engaged   Fund of Knowledge: Appropriate    Language /Speech Content: Fluent   Language /Speech Volume: Normal    Language /Speech Rate/Productions: Normal    Recent Memory: Intact   Remote Memory: Intact   Mood: Depressed    Orientation to Person: Yes    Orientation to Place: Yes   Orientation to Time of Day: No    Orientation to Date: No    Situation (Do they understand why they are here?): Yes    Psychomotor Behavior: Normal    Thought Content: Hallucinations   Thought Form: Intact     Diagnosis:   298.9 (F29)  Unspecified Schizophrenia Spectrum  Substance-Related & Addictive Disorders Alcohol Use " Disorder   303.90 (F10.20) Severe      Therapeutic Intervention(s):   Provided active listening, unconditional positive regard, and validation. Reviewed healthy living that supports positive mental health, including looking at sleep hygiene, regular movement, nutrition, and regular socialization. Provided positive reinforcement for progress towards goals, gains in knowledge, and application of skills previously taught.     Treatment Objective(s) Addressed:   The focus of this session was on rapport building, identifying and practicing coping strategies and identifying additional supports.     Progress Towards Goals:   Patient reports stable symptoms. Patient is not making progress towards treatment goals as evidenced by continue auditory hallucinations.     Case Management:   Honey reported he had court today and wanted help notifying courts and rescheduling. He signed SUSANNE for [facility] [phone]. Writer was able to speak with court staff who confirmed he did miss court appearance this morning at 8:30am, but  agreed to reschedule court date. This court appearance is associated with an airport violation and court only happens on Mondays. New court date below:    New court date Monday 5/15/23 at 8:30am at [facility]  [phone]   [address] [place] [address]    Plan:   Inpatient Mental Health:     Attending concurs with disposition: Yes        Clinical Summary and Substantiation of Recommendations    Honey continues to present with sx of depression, hopelessness, low mood, and reporting command hallucinations telling him to kill himself. He has been calm and cooperative on unit. He continues to remain appropriate for inpt psych admission. See initial consult note for full admission criteria.     Caridad Paul, [facility] Hospital[facility]

## 2023-04-24 NOTE — PROGRESS NOTES
Patient spent the shift in his recliner watching TV. He has not dmitriy social with peers but is calm and cooperative with staff.  He endorses having some suicidal thoughts but with less frequency and less intensity than yesterday. He also continues to endorse auditory hallucinations that are command in nature but aslo states they are quitter and less intense than yesterday.  He is med compliant and states he is glad he is back on his medications. Mood is depressed. Affect brightens on approach. Not scoring on CIWA. Plan for him to go inpatient. He is volentary.

## 2023-04-24 NOTE — TELEPHONE ENCOUNTER
Updated Bed Search @ 6:54 PM  Per chart review, intake can look in the [facility] for placement     [facility] has 0 appropriate beds available. Phone: [phone]  [facility]note 2[facility] posting 0 available beds. Phone: [phone]  [facility] posting 0 available beds. Phone: [phone]  [facility] posting 0 available beds. Phone: [phone]  [facility] posting 0 available beds. Phone: [phone]  [facility] posting 0 available beds. Phone:[phone]  [facility] posting 0 available beds. Phone: [phone]. Negative covid required.       Pt remains on work list pending appropriate bed placement.

## 2023-04-24 NOTE — PROGRESS NOTES
BP elevated, PTA medications given, Clonidine scheduled added, denies pain, denies nausea, no tremors, tolerating diet, compliant with medications, plan to stay overnight, plan for inpatient treatment. CIWA score 2, 2.

## 2023-04-24 NOTE — PROGRESS NOTES
Pt spent this morning resting in recliner, watching TV. He declined to take scheduled aspirin this morning but was otherwise compliant with all other scheduled medications. Blood pressure elevated to 164/116 which improved to 128/90 following morning medications. Pt scoring 4 and 1 on CIWA, no PRN medications given per protocol. Pt continues to endorse passive SI but less intense than yesterday. He reports it has been helpful talking with [facility] staff while here. Awaiting reassessment today.    Pt reported yesterday he had court at 1:30 PM today, however, [facility] contacted court and was informed hearing was actually at 0830. Caridad MAHER contacted court and it was rescheduled for 5/15/23. Pt updated.

## 2023-04-24 NOTE — ED NOTES
IP MH Referral Acuity Rating Score (RARS)     LMHP complete at referral to IP MH, with DEC; and, daily while awaiting IP MH placement. Call score to PPS.     CRITERIA SCORING Total    New 72 HH and Involuntary for IP MH (not adolescent) 0/1   Boarding over 24 hours 1/1   Vulnerable adult at least 55+ with multiple co morbidities; or, Patient age 11 or under 0/1   Suicide ideation without relief of precipitating factors 1/1   Current plan for suicide 1/1   Current plan for homicide 0/1   Imminent risk or actual attempt to seriously harm another without relief of factors precipitating the attempt 0/1   Severe dysfunction in daily living (ex: complete neglect for self care, extreme disruption in vegetative function, extreme deterioration in social interactions) 1/1   Recent (last 2 weeks) or current physical aggression in the ED 0/1   Restraints or seclusion episode in ED 0/1   Verbal aggression, agitation, yelling, etc., while in the ED 0/1   Active psychosis with psychomotor agitation or catatonia 0/1   Need for constant or near constant redirection (from leaving, from others, etc).  0/1   Intrusive or disruptive behaviors 0/1   TOTAL Acuity Total Score: 4

## 2023-04-24 NOTE — TELEPHONE ENCOUNTER
No appropriate beds are currently available within the  system. Bed search update ([facility]) @ 12:57AM:      [facility]: @ cap per website  [facility]: @ cap per website  [facility]: @ cap per website  [facility]: @ cap per website  [facility]: @ cap per website  [facility]: @ cap per website  [facility]: @ cap per website    Pt remains on work list until appropriate placement is available

## 2023-04-25 NOTE — TELEPHONE ENCOUNTER
[facility] Access Inpatient Bed Call Log 4/25/2023 12:56 AM      Intake has called facilities that have not updated their bed status within the last 12 hours.     Adults:         [facility] is posting 0 beds.      [facility] is posting 0 beds. [phone]      [facility] is posting 0 beds. [phone]     [facility] is posting 0 beds. [phone] -12:14am Hua Perez, they are capped.    [facility] is posting 0 beds. [phone]     [facility] is posting 0 bed. [phone]      [facility] is posting 0 beds. [phone]     [facility] is posting 0 beds. [phone]     [facility] is posting 0 beds. [phone]     [facility] is posting 0 beds. [phone]          [facility] is posting 0 beds. Mixed unit 12+. Low acuity only.  [phone]     [facility] is posting 0 beds. No aggression.  [phone]     [facility] is posting 0 beds. [phone]      [facility] is posting 0 beds. [phone]      [facility] is posting 0 beds. [phone]      [facility] is posting 0 beds. Low acuity. [phone]     [facility] is posting 1 beds. 72 hr hold preferred. [phone] - 12:57am Ninfa, they are capped.     [facility] is posting 2 beds.  [phone]        [facility] is posting 0 bed. Voluntary only- no holds/commitments, No Hx of aggression, violence, or assault. No sexual offenders. No 72 hr holds. [phone]     [facility] is posting 7 beds. (Must have the cognitive ability to do programming. No aggressive or violent behavior or recent HX in the last 2 yrs. MH must be primary.) [phone]    [facility] is posting 0 beds. Low acuity only. Violence and aggression capped. [phone]      [facility] is posting 0 beds. Low acuity, Neg Covid. [phone] -12:59am [facility], they are able to review.    [facility] is posting 0 beds. Covid neg.  Vol only. Combined adolescent and adult unit. No aggressive or violent behavior. No registered sex offenders. [phone] - 1AM Kendra, they are capped until 9AM.     [facility] [place] posting 0 beds. Negative covid.      [facility] is posting 46 beds. Call for details. [phone]      [facility] is posting 1 beds. [phone] - 1:01AM Olivia, they are currently reviewing all open beds.      Pt remains on work list pending appropriate bed availability.

## 2023-04-25 NOTE — PROGRESS NOTES
"Triage & Transition Services [facility]     Progress Note    Patient: Honey, but prefers to go by his last name \"Jaren\" uses he/him pronouns  Date of Service: April 25, 2023  Site of Service:   Patient was seen in-person.     Presenting problem:  Please see initial DEC/[facility] Crisis Assessment completed by BERNADETTE Mejia on 4/23/23 for complete assessment information. Notable concerns include command hallucinations to harm self, telling him he is bad.     Individuals Present: Honey & BERNADETTE Gasca    Session start: 12:10pm  Session end: 12:30pm  Session duration in minutes: 20 min + consultation with attending provider and RN  CPT utilized: 23888 - Psychotherapy (with patient) - 30 (16-37*) min    Current Presentation:   Jaren was observed at nurses station, collecting items to shower. He was calm, polite cooperative and eagerly agreed to meet with writer. He was somewhat tangential, writer interrupted him several times to redirect conversation. He reported improved mood and decrease in intensity of auditory hallucinations. He is still experiencing voices telling him to kill himself and that he is a bad person, but feels medications are helping. Jaren reported frustration that people in the community frequently accuse him of being intoxicated due to his slurred speech (slurred speech is present at baseline due to hx of stroke). Writer validated this frustration and also inquired about actual alcohol use. Jaren acknowledged he knows he should reduce alcohol use, but does not feel ready to take any steps at this time. He did not want to complete TROY assessment at this time, but is okay with staff asking him again. Writer also encouraged Jaren to call his mother for additional support. Jaren would like referral to [facility] services (for case management) upon discharge from [facility] or hospital.     Additional Collateral Information:  n/a     Mental Status Exam     Affect: Appropriate   Appearance: " Appropriate    Attention Span/Concentration: Attentive  Eye Contact: Engaged   Fund of Knowledge: Appropriate    Language /Speech Content: Fluent   Language /Speech Volume: Normal    Language /Speech Rate/Productions: Normal    Recent Memory: Intact   Remote Memory: Intact   Mood: Normal    Orientation to Person: Yes    Orientation to Place: Yes   Orientation to Time of Day: Yes    Orientation to Date: Yes    Situation (Do they understand why they are here?): Yes    Psychomotor Behavior: Normal    Thought Content: Clear and Hallucinations   Thought Form: Intact and Tangential     Diagnosis:   298.9 (F29)  Unspecified Schizophrenia Spectrum  Substance-Related & Addictive Disorders Alcohol Use Disorder   303.90 (F10.20) Severe      Therapeutic Intervention(s):   Provided active listening, unconditional positive regard, and validation. Provided positive reinforcement for progress towards goals, gains in knowledge, and application of skills previously taught.  Worked on relapse prevention planning (review of stressors, early warning signs, written plan to respond to signs, and rehearse plan). Explored motivation for behavioral change.    Treatment Objective(s) Addressed:   The focus of this session was on rapport building, identifying and practicing coping strategies, processing feelings related to being perceived as being intoxicated due to slurred speech from stroke, identifying treatment goals and identifying additional supports.     Progress Towards Goals:   Patient reports improving symptoms. Patient is making progress towards treatment goals as evidenced by improved mood, decrease in intensity of auditory hallucinations.     Case Management:   n/a     Plan:   Inpatient Mental Health: pt continues to be appropriate for in mental health care. See initial consult for full criteria.     -Jaren acknowledged he knows he should reduce alcohol use, but does not feel ready to take any steps at this time. He did not want  to complete TROY assessment at this time, but is okay with staff asking him again.   -Jaren would like referral to [facility] services (for case management) upon discharge from [facility] or hospital.       Attending concurs with disposition: Yes        Clinical Summary and Substantiation of Recommendations    It is the recommendation of this clinician that the patient be admitted to inpatient mental health care for further treatment, stabilization and safety. Attempts at managing mental health symptoms and maintaining safety at a lower level of care have been unsuccessful. Patient s current mental health symptoms are requiring a secure setting due to: pt is displaying symptoms of psychosis that are impairing ability to function safely in the community.  Jaren continues to be appropriate for in mental health care. See initial consult for full criteria.       Caridad Paul, DYLONSW

## 2023-04-25 NOTE — PROGRESS NOTES
"Patient has been resting in recliner this morning. Full range affect, reports his mood is better today than yesterday. Pt is more conversant this morning, observed socializing with peers and laughing. He reports he feels good about not having the option to drink while he has been here. States he usually \"goes straight to the liquor store when I get mad\" but has been working to use healthier coping skills here. Patient has been eating and drinking well. Declined to take aspirin but otherwise compliant with scheduled morning medications. Scoring 0 on CIWA scale. Continues to await inpatient placement.  "

## 2023-04-25 NOTE — TELEPHONE ENCOUNTER
R:  No [facility] beds available.    [facility] Access Inpatient Bed Call Log 4/25/2023 3:15 PM  Intake has called facilities that have not updated their bed status within the last 12 hours.    Adults:    [facility] is posting 0 beds.     [facility] is posting 0 beds. [phone] Update received 330pm. @cap    [facility] is posting 0 beds. [phone]    [facility] is posting 0 beds. [phone]    [facility] is posting 0 beds. [phone]    [facility] is posting 0 beds. [phone]    [facility] is posting 0 beds. [phone]    [facility] is posting 0 beds. [phone]    [facility] [phone]    [facility] is posting 0 beds. [phone]    Pt remains on work list pending appropriate bed availability.

## 2023-04-25 NOTE — PROGRESS NOTES
Patient requested to shower and was provided with hygiene supplies and clean scrubs. Lunch ordered for patient. Patient remains in a good mood, cooperative with cares, social with staff. No behavioral concerns.

## 2023-04-25 NOTE — ED NOTES
Writer attempted to complete therapeutic check in with pt. Pt was sleeping, did not wake up to writer's voice. Will attempt to check in later today.     Caridad Paul, [facility] Center[facility]

## 2023-04-25 NOTE — PROGRESS NOTES
[facility] Group Progress Note    Client Name: Honey Eastman  Date: April 25, 2023  Service Type:  Group Therapy    Response:  Patient did not participate in group.        Delgado Sen LPCC

## 2023-04-25 NOTE — ED NOTES
Pt slept uneventfully in recliner through the night. Pt woke up a few times during the night, mostly to the bathroom. No signs of distress noted. Will continue to monitor.

## 2023-04-25 NOTE — ED NOTES
Pt sitting at his recliner eating a snack. Introduced self to Pt. Pt declined vitals and stated he already had too many checks. Pt declined any symptoms on the CIWA scale. Pt declined any concerns at the moment. He denies any SI/HI/[place].

## 2023-04-25 NOTE — ED NOTES
IP MH Referral Acuity Rating Score (RARS)     LMHP complete at referral to IP MH, with DEC; and, daily while awaiting IP MH placement. Call score to PPS.     CRITERIA SCORING Total    New 72 HH and Involuntary for IP MH (not adolescent) 0/1   Boarding over 24 hours 1/1   Vulnerable adult at least 55+ with multiple co morbidities; or, Patient age 11 or under 0/1   Suicide ideation without relief of precipitating factors 1/1   Current plan for suicide 1/1   Current plan for homicide 0/1   Imminent risk or actual attempt to seriously harm another without relief of factors precipitating the attempt 0/1   Severe dysfunction in daily living (ex: complete neglect for self care, extreme disruption in vegetative function, extreme deterioration in social interactions) 1/1   Recent (last 2 weeks) or current physical aggression in the ED 0/1   Restraints or seclusion episode in ED 0/1   Verbal aggression, agitation, yelling, etc., while in the ED 0/1   Active psychosis with psychomotor agitation or catatonia 0/1   Need for constant or near constant redirection (from leaving, from others, etc).  0/1   Intrusive or disruptive behaviors 0/1   TOTAL Acuity Total Score: 4      No change in RARS score today 4/25/23.     Caridad Paul, DYLONSW

## 2023-04-26 NOTE — ED NOTES
Pt slept uneventfully in recliner through the night. No signs of distress or withdrawals noted. Respirations were even and unlabored. Pt woke up around 5:30 am and remained awake since then. Pt approached the nurse's station and asked when he could order breakfast.

## 2023-04-26 NOTE — TELEPHONE ENCOUNTER
R: Pt presented for shared bed on unit 22.  Resident requests PPS to verify that pt is agreeable to be in a shared room.  Per Yolis with [facility], pt is not agreeable to have roommate and wants his own room.  Pt remains on PPS worklist awaiting appropriate placement.

## 2023-04-26 NOTE — PROGRESS NOTES
Pt calm and cooperative today. Social with peers and staff. Spending time watching TV or sitting at table in milieu. Denies SI. Reports auditory hallucinations are less frequent than yesterday. He states that talking with peers has been helpful in reducing hallucinations. Eating and drinking well. Denies any needs or concerns at this time. Continues to await inpatient bed.

## 2023-04-26 NOTE — PROGRESS NOTES
[facility] Group Progress Note    Client Name: Honey Eastman  Date: April 26, 2023  Service Type:  Group Therapy    Topic:   Morning check in group/goal for the day     Response:  Patient did not participate in group.        JENELLE Gaviria, LICSW

## 2023-04-26 NOTE — TELEPHONE ENCOUNTER
[facility] Access Inpatient Bed Call Log 4/26/2023 1:23 AM      Intake has called facilities that have not updated their bed status within the last 12 hours.     Adults:         [facility] is posting 0 beds.      [facility] is posting 0 beds. [phone]      [facility] is posting 0 beds. [phone]     [facility] is posting 0 beds. [phone]     [facility] is posting 0 beds. [phone]     [facility] is posting 0 bed. [phone]      [facility] is posting 0 beds. [phone]     [facility] is posting 0 beds. [phone]     [facility] is posting 0 beds. [phone]     [facility] is posting 0 beds. [phone]          [facility] is posting 2 beds. Mixed unit 12+. Low acuity only.  [phone]     [facility] is posting 0 beds. No aggression.  [phone]     [facility] is posting 0 beds. [phone]      [facility] is posting 0 beds. [phone]      [facility] is posting 0 beds. [phone]      [facility] is posting 3 beds. Low acuity. [phone]     [facility] is posting 1 beds. 72 hr hold preferred. [phone] - 12:57am Ninfa, they are capped.     [facility] is posting 1 beds.  [phone]        [facility] is posting 0 bed. Voluntary only- no holds/commitments, No Hx of aggression, violence, or assault. No sexual offenders. No 72 hr holds. [phone]     [facility] is posting 4 beds. (Must have the cognitive ability to do programming. No aggressive or violent behavior or recent HX in the last 2 yrs.  must be primary.) [phone]    [facility] is posting 0 beds. Low acuity only. Violence and aggression capped. [phone]      [facility] is posting 0 beds. Low acuity, Neg Covid. [phone]     [facility] is posting 1 beds. Covid neg. Vol only. Combined adolescent and adult unit. No aggressive or violent  behavior. No registered sex offenders. [phone]     [facility] [place] posting 0 beds. Negative covid.      [facility] is posting 6 beds. Call for details. [phone]      [facility] is posting 3 beds. [phone]       Pt remains on work list pending appropriate bed availability.

## 2023-04-26 NOTE — ED NOTES
Pt has spent most of the shift resting on the recliner watching tv and eating snacks. Pt reports he feels peaceful and quiet and that is why he doesn't bother anyone. Pt compliant with vital checks and scheduled medications. Pleasant and cooperative.

## 2023-04-26 NOTE — ED NOTES
Pt complained of chest pain on right side of chest. 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 Pt reports he seldom gets this pain. Provider notified. EKG ordered. BP:156/98, pulse 83, O2 is 98% on RA.

## 2023-04-27 NOTE — ED NOTES
Pt currently talking to [facility] regarding discharge planning.Pt is stable enough to go home. The voices are much better and not bothering him. He is not suicidal and has a calm bright affect.Will be going to a shelter and needs to be there by 2100. Will call for a cab by 2000

## 2023-04-27 NOTE — TELEPHONE ENCOUNTER
R:  No Beds within [facility]  Pt is voluntary and willing to go anywhere in [place] -  Bed search initiated:     [place]  Access Inpatient Bed Call Log 4/27/2023 @ 7:20 am  @ 12:30pm    Intake has called facilities that have not updated their bed status within the last 12 hours.      ?Adults:     [facility] is posting 0 beds.      [facility] is posting 0 beds. [phone]      [facility] is posting 0 beds. [phone]     [facility] is posting 0 beds. [phone]  per call at 7:24 am to [facility], they are at cap but we can call after 10 or 11 am.     [facility] is posting 0 beds. [phone]     [facility] is posting 0 beds. [phone]     [facility] is posting 0 beds. [phone]     [facility] is posting 0 beds. [phone]     [facility] is posting 0 beds [phone]     Pt remains on the worklist

## 2023-04-27 NOTE — TELEPHONE ENCOUNTER
Updated Bed Search @ 655pm  Per chart review, [place]     [facility] has 0 appropriate beds available. Phone: [phone]  [facility] posting 0 available beds. Phone: [phone]  [facility] posting 0 available beds. Phone: [phone]  [facility] posting 0 available beds. Phone: [phone]  [facility] posting 0 available beds. Phone: [phone]  [facility] posting 0 available beds. Phone:[phone]  [facility] posting 0 available beds. Phone: [phone]. Negative covid required.   [facility] posting 0 available beds. Phone: [phone]     Pt remains on work list pending appropriate bed placement.

## 2023-04-27 NOTE — PROGRESS NOTES
IP MH Referral Acuity Rating Score (RARS)    LMHP complete at referral to IP MH, with DEC; and, daily while awaiting IP MH placement. Call score to PPS.    CRITERIA SCORING Total    New 72 HH and Involuntary for IP MH (not adolescent) 0/1   Boarding over 24 hours 1/1   Vulnerable adult at least 55+ with multiple co morbidities; or, Patient age 11 or under 0/1   Suicide ideation without relief of precipitating factors 0/1   Current plan for suicide 0/1   Current plan for homicide 0/1   Imminent risk or actual attempt to seriously harm another without relief of factors precipitating the attempt 0/1   Severe dysfunction in daily living (ex: complete neglect for self care, extreme disruption in vegetative function, extreme deterioration in social interactions) 1/1   Recent (last 2 weeks) or current physical aggression in the ED 0/1   Restraints or seclusion episode in ED 0/1   Verbal aggression, agitation, yelling, etc., while in the ED 0/1   Active psychosis with psychomotor agitation or catatonia 0/1   Need for constant or near constant redirection (from leaving, from others, etc).  0/1   Intrusive or disruptive behaviors 0/1   TOTAL Acuity Total Score: 2     CRISTOPHER Partida

## 2023-04-27 NOTE — ED NOTES
[facility] Group Progress Note    Client Name: Honey Eastman  Date: April 26, 2023  Service Type:  Group Therapy  Session Start Time:  7:30pm  Session End Time: 8:30pm  Session Length: 60 minutes  Attendees: Patient and other group members  Facilitator: CRISTOPHER Cast     Topic:   Group activity. Choice to do meditation or play a game.      Intervention:    Group process: support, challenge, affirm, psycho-education.     Response:  Patient did not participate in group. Pt refused.       CRISTOPHER Cast

## 2023-04-27 NOTE — ED NOTES
Patient agrees to discharge plan. Discharge instructions reviewed with patient including follow-up care plan. Medications: he will get at [facility]. Reviewed safety plan and outpatient resources. Denies SI and HI. All belongings that were brought into the hospital have been returned to patient. Escorted off the unit at 1650 accompanied by [facility] staff. Discharged to Shelter via bus.

## 2023-04-27 NOTE — ED NOTES
Discharge Pharmacy called and all his medications are too soon to fill because [place] a train. They attempted to change doses to se if this would help the process. Pt said he did not want to wait and will take a bus to [facility] where he can get them filled there. Then he will go to the [facility] at [address] to stay overnight. Pt given 3 tokens for this.Pt's Primary MD is [facility]. He has been at the [facility] multiple times.

## 2023-04-27 NOTE — ED NOTES
Pt was informed that he may have access to an inpatient bed today.  Attending resident relayed concerns about if Pt meets current criteria for inpatient.  Pt was placed on psychiatry's assessment list today.    Per RN notes Pt reported a reduction of psychosis sx and denied any safety concerns.    Writer informed the Pt that he may have an inpatient bed available to him however it would be with a roommate.  PT declined.  When Writer spoke with him about his available options he stated that he had bad previous experiences with roommates and will wait for a bed with a private room only.  Writer attempted to challenge this thinking stated that he is on a unit with 14 peers and has been observed to be socializing appropriately.  Pt con't to decline inpatient bed with roommate.    In discussion with attending psychiatry post assessment, Pt would potentially benefit from discussion about crisis housing for outpatient support for tomorrow

## 2023-04-27 NOTE — ED PROVIDER NOTES
[facility] Unit - Psychiatric Consultation  [facility] Emergency Department    Honey Eastman MRN: [id number]   Age: 52 year old YOB: 1970     History     Chief Complaint   Patient presents with     Alcohol Intoxication     HPI  Honey Eastman is a 52 year old male with a past history notable for schizophrenia, polysubstance use (alcohol, cannabis, amphetamines, crack cocaine), hx of CVA, hx of TBI, hx of seizures. Patient presented to the emergency department for evaluation of alcohol intoxication after [place] after he had apparently fallen. BAL on arrival was 0.33. Patient has over 19 ED visits over the last month. In the ED, patient complaining of auditory hallucinations with voices telling him to harm himself and patient endorsed a plan to jump off the [place] [employer]. Patient was medically evaluated in the emergency department and after a period of observation, was found to be medically cleared for transfer to [facility] for further psychiatric assessment.     Patient seen for reassessment today. Initially, the plan was to pursue inpatient psychiatric hospitalization. Patient was offered a bed this afternoon but with the condition that he would have a roommate. Patient declined having a roommate. He is now seen for follow-up. During interview, patient is now stating that he would be okay with a roommate. He is concerned because he likes to get up in the middle of the night and is worried that this would upset a roommate. He reports he is feeling somewhat better than when he first arrived, citing the therapeutic environment and restarting his medications as helpful. He continues to report auditory hallucinations of voices. He states that the voices tend to worsen at night. Reports he hears laughing. Continues to endorse passive suicidal ideation but did not mention a plan. He talks about his desire to get a CADI waiver and to be able to obtain long-term housing. He does not  "feel that he would be safe to discharge to the street.     Past Medical History  Past Medical History:   Diagnosis Date     Anxiety      Depressive disorder      Hypertension      Substance abuse      No past surgical history on file.  atorvastatin (LIPITOR) 40 MG tablet  carvedilol (COREG) 25 MG tablet  divalproex sodium extended-release (DEPAKOTE ER) 500 MG 24 hr tablet  hydrocodone-acetaminophen 5-325 MG per tablet  IBUPROFEN  lactulose (CEPHULAC) 20 GM Packet  levETIRAcetam (KEPPRA) 500 MG tablet  melatonin 1 MG TABS tablet  Oxycodone-Acetaminophen (PERCOCET PO)  risperiDONE (RISPERDAL) 1 MG tablet  valsartan (DIOVAN) 80 MG tablet      Allergies   Allergen Reactions     Lisinopril Swelling     Pollen Extract      Family History  No family history on file.  Social History   Social History     Tobacco Use     Smoking status: Every Day     Packs/day: 1.50     Types: Cigarettes     Smokeless tobacco: Never   Substance Use Topics     Alcohol use: Yes     Comment: couple drinks earlier     Drug use: No     Comment: crack           Review of Systems  A medically appropriate review of systems was performed with pertinent positives and negatives noted in the HPI, and all other systems negative.    Physical Examination   BP: (!) 139/92  Pulse: 98  Temp: 97.4  F (36.3  C)  Resp: 18  Height: 175.3 cm (5' 9\")  Weight: 81.6 kg (180 lb)  SpO2: 97 %    Physical Exam  General: Appears stated age.   Neuro: Alert and fully oriented. Extremities appear to demonstrate normal strength on visual inspection.   Integumentary/Skin: no rash visualized, normal color    Psychiatric Examination   Appearance: awake, alert, adequately groomed, dressed in hospital scrubs and appeared as age stated  Attitude:  cooperative  Eye Contact:  fair  Mood:  better  Affect:  mood congruent and intensity is normal  Speech:  dysarthria and normal prosody  Psychomotor Behavior:  no evidence of tardive dyskinesia, dystonia, or tics  Thought Process:  linear " and goal oriented  Associations:  no loose associations  Thought Content:  endorses passive suicidal ideation and does not identify a current plan. Endorses auditory hallucinations of laughing. Denies visual hallucinations. No paranoia or delusional beliefs elicited.  Insight:  fair  Judgement:  fair  Oriented to:  time, person, and place  Attention Span and Concentration:  fair  Recent and Remote Memory:  intact  Language: able to name/identify objects without impairment  Fund of Knowledge: intact with awareness of current and past events    ED Course        Labs Ordered and Resulted from Time of ED Arrival to Time of ED Departure   COMPREHENSIVE METABOLIC PANEL - Abnormal       Result Value    Sodium 139      Potassium 3.9      Chloride 105      Carbon Dioxide (CO2) 24      Anion Gap 10      Urea Nitrogen 6.7      Creatinine 0.85      Calcium 8.4 (*)     Glucose 97      Alkaline Phosphatase 135 (*)     AST 30      ALT 22      Protein Total 6.7      Albumin 3.9      Bilirubin Total 0.2      GFR Estimate >90     ETHYL ALCOHOL LEVEL - Abnormal    Alcohol ethyl 0.33 (*)    CBC WITH PLATELETS AND DIFFERENTIAL - Abnormal    WBC Count 3.1 (*)     RBC Count 4.55      Hemoglobin 13.5      Hematocrit 41.5      MCV 91      MCH 29.7      MCHC 32.5      RDW 13.8      Platelet Count 213      % Neutrophils 45      % Lymphocytes 47      % Monocytes 7      % Eosinophils 1      % Basophils 0      % Immature Granulocytes 0      NRBCs per 100 WBC 0      Absolute Neutrophils 1.4 (*)     Absolute Lymphocytes 1.5      Absolute Monocytes 0.2      Absolute Eosinophils 0.0      Absolute Basophils 0.0      Absolute Immature Granulocytes 0.0      Absolute NRBCs 0.0     COVID-19 VIRUS (CORONAVIRUS) BY PCR - Normal    SARS CoV2 PCR Negative     MAGNESIUM - Normal    Magnesium 1.9     DRUG ABUSE SCREEN 77 URINE (FL, RH, SH) - Normal    Amphetamines Urine Screen Negative      Barbituates Urine Screen Negative      Benzodiazepine Urine Screen  Negative      Cannabinoids Urine Screen Negative      Opiates Urine Screen Negative      PCP Urine Screen Negative      Cocaine Urine Screen Negative         Assessments & Plan (with Medical Decision Making)   Patient presenting with alcohol intoxication. He is also endorsing command auditory hallucinations of a voice telling him to end his life. Had a plan to jump [place]. Hx of schizophrenia, TBI, seizures, stroke. Had not taken medications for several days as he left his backpack on the lightrail. Nursing notes reviewed noting no acute issues.     I have reviewed the assessment completed by the [facility].     Preliminary diagnosis:    ICD-10-CM    1. Fall, initial encounter  W19.XXXA       2. Schizophrenia (H)  F20.9       3. History of traumatic brain injury  Z87.820       4. Alcohol use disorder  F10.90            Treatment Plan:  - Will plan to hold off on pursuing inpatient psychiatric placement as patient had initially declined a roommate, although did change his mind later on.   - A crisis residence appears to be a more appropriate disposition. Recommend discharge to a crisis residence tomorrow.   - Patient will remain on observation status while identifying a safe disposition plan.   - Continue current medication regimen - patient appears to be improving with the resumption of his PTA medications.   - Patient was complaining of chest pain today. EKG ordered and returned NSR.     --  Lucio Kapoor CNP   [facility] EMERGENCY DEPT  EmPATH Unit  4/22/2023      Lucio Kapoor CNP  04/26/23 2029

## 2023-04-27 NOTE — ED NOTES
Pt reports his chest pain is better. Pt observed sitting on the recliner watching tv, eating a snack and socializing with peers. Pt reports having auditory hallucinations but states he is managing and talking with his peers helps.

## 2023-04-27 NOTE — TELEPHONE ENCOUNTER
R:  Jack cabrales [facility] called to report pt is d/cing from the ED.  Worklist updated

## 2023-04-27 NOTE — PROGRESS NOTES
[facility] Crisis Reassessment      Honey Eastman was reassessed at the request of Magda Felix and Courtney Bryan, Clinic Manager and EmPATH Supervisor for the following reasons: discharge disposition instead of inpatient mental health. Pt was first seen on 4/23/2023 by Lawanda Oshea; see the initial assessment note for details.      Patient Presentation    Initial ED presentation details: alcohol intoxication and homelessness    Current patient presentation: Patient is calm, cooperative, engaging, and future oriented.  He readily met with writer to discuss plan of care that does not involve going inpatient mental health.  Patient acknowledges his slightly impoverished speech and thought delay as a result of a TBI and stroke.      Patient accepted writer's information about inpatient admission not being an option any longer to due to his denial of a shared inpatient room last night.  Writer presented alternative options for discharge to patient which he was receptive to.  Those options included crisis residences that is a max 10-day stay or homeless shelter.  Patient is aware that all crisis residences are full today for males so that is one less option for today.  Writer provided education that patient can self-referral to crisis residences at any point from the community.     Patient talked about his ultimate goal of securing housing which he knows the ED cannot provide assistance with that.  Writer provided education to patient that if he stays in a homeless shelter a certain number of nights, he will then be eligible to meet with a housing advocate for longer term housing identification.      Patient asked about how he would get his medications if he were to discharge from [facility] so writer explained that we could fill his medications here to send them with him.  He was pleased with information, asked about scheduling with his PCP at [facility], and inquired about how long he would still be here.  Writer offered to call [facility] to  schedule that follow up PCP appointment on his behalf since he is actively working on a revised plan of care with writer.     At no point during this assessment did patient endorse or reference suicidal thoughts.  He did acknowledged auditory hallucinations at the beginning indicating they are better than when he arrived initially.       Risk of Harm    [place] Suicide Severity Rating Scale Full Clinical Version: 4/23/2023  Suicidal Ideation  1. Wish to be Dead (Lifetime): Yes  1. Wish to be Dead (Past 1 Month): Yes  Wish to be Dead Description (Past 1 Month): y  2. Non-Specific Active Suicidal Thoughts (Lifetime): Yes  Non-Specific Active Suicidal Thought Description (Lifetime): y  2. Non-Specific Active Suicidal Thoughts (Past 1 Month): Yes  3. Active Suicidal Ideation with any Methods (Not Plan) Without Intent to Act (Lifetime): Yes  Active Suicidal Ideation with any Methods (Not Plan) Description (Lifetime): y  3. Active Suicidal Ideation with any Methods (Not Plan) Without Intent to Act (Past 1 Month): Yes  Active Suicidal Ideation with any Methods (Not Plan) Description (Past 1 Month): y  4. Active Suicidal Ideation with Some Intent to Act, Without Specific Plan (Lifetime): No  4. Active Suicidal Ideation with Some Intent to Act, Without Specific Plan (Past 1 Month): No  5. Active Suicidal Ideation with Specific Plan and Intent (Lifetime): No  5. Active Suicidal Ideation with Specific Plan and Intent (Past 1 Month): No  Intensity of Ideation  Frequency (Past 1 Month): Less than once a week  Controllability (Lifetime): Does not attempt to control thoughts  Controllability (Past 1 Month): Does not attempt to control thoughts  Reasons for Ideation (Past 1 Month): Does not apply     C-SSRS Risk (Lifetime/Recent)  Calculated C-SSRS Risk Score (Lifetime/Recent): Moderate Risk    [place] Suicide Severity Rating Scale Since Last Contact: 4/27/2023  Suicidal Ideation (Since Last Contact)  1. Wish to be Dead (Since  Last Contact): No  2. Non-Specific Active Suicidal Thoughts (Since Last Contact): No  Suicidal Behavior (Since Last Contact)  Actual Attempt (Since Last Contact): No  Has subject engaged in non-suicidal self-injurious behavior? (Since Last Contact): No  Interrupted Attempts (Since Last Contact): No  Aborted or Self-Interrupted Attempt (Since Last Contact): No  Preparatory Acts or Behavior (Since Last Contact): No  Suicide (Since Last Contact): No     C-SSRS Risk (Since Last Contact)  Calculated C-SSRS Risk Score (Since Last Contact): No Risk Indicated    Validity of evaluation is not impacted by presenting factors during interview.   Comments regarding subjective versus objective responses to [place] tool: n/a  Environmental or Psychosocial Events: unemployment/underemployment, unstable housing, homelessness, excessive debt, poor finances, other life stressors, history of TBI and ongoing abuse of substances  Chronic Risk Factors: history of suicide attempts (2 or more), history of psychiatric hospitalization, history of abuse or neglect and chronic health problems   Warning Signs: talking or writing about death, dying, or suicide, increasing substance use or abuse, dramatic changes in mood and recent discharges from emergency department or inpatient psychiatric care  Protective Factors: help seeking  Interpretation of Risk Scoring, Risk Mitigation Interventions and Safety Plan:  Patient does not bring up suicidal ideations when discussing change in plan of care.  He notes that his mental health status is closely linked to housing and medication administration.     Does the patient have thoughts of harming others? No    Mental Status Exam   Affect: Appropriate   Appearance: Appropriate    Attention Span/Concentration: Attentive?    Eye Contact: Engaged   Fund of Knowledge: Appropriate    Language /Speech Content: Fluent   Language /Speech Volume: Normal    Language /Speech Rate/Productions: Normal    Recent Memory:  "Intact   Remote Memory: Intact   Mood: Normal    Orientation to Person: Yes    Orientation to Place: Yes   Orientation to Time of Day: Yes    Orientation to Date: Yes    Situation (Do they understand why they are here?): Yes    Psychomotor Behavior: Normal    Thought Content: Clear   Thought Form: Goal Directed and Intact       Additional Collateral Information   Writer called [facility] to secure patient an overnight shelter bed.     Writer called [facility] to schedule a follow up appointment with patient's PCP for Tuesday, May 12 at 2:30pm.        Therapeutic Intervention  The following therapeutic methodologies were employed when working with the patient: Establishing rapport, Active listening, Assess dimensions of crisis, Apply solution-focused therapy to address current crisis, Identify additional supports and alternative coping skills, Establish a discharge plan and Safety planning. Patient response to intervention: engaged and cooperative.    Diagnosis:   298.9 (F29)  Unspecified Schizophrenia Spectrum  Substance-Related & Addictive Disorders Alcohol Use Disorder   303.90 (F10.20) Severe       Clinical Substantiation of Recommendations  Patient was seen for reassessment to explore current mental health status and plan for discharge.  Patient notes that he is doing better yet still experiencing some auditory hallucinations.  He independently offers \"staying on my meds with help them too.\"  Patient was informed that his original disposition of inpatient hospitalization is the last option on his disposition list currently so he was willing to explore alternative options of crisis residence or homeless shelter.  Since there are no crisis beds available he was comfortable with planning for homeless shelter which he has done before.  Patient also asked for a PCP follow up appointment to be scheduled.  He discharged earlier than planned so he could go to [facility] to  his " medications that were too early to fill here.     Plan:    Disposition  Recommended disposition: Individual Therapy and Medication Management      Reviewed case and recommendations with attending provider. Attending Name: Andish      Attending concurs with disposition: Yes      Patient and/or verified legal guardian concurs with disposition: Yes      Final disposition: Individual therapy  and Medication management.         Assessment Details  Total duration spent on the patient case in minutes: .50 hrs     CPT code(s) utilized: 33568 - Psychotherapy (with patient) - 30 (16-37*) min       Jack Biswas, NANCIE, MSW  Callback: [phone]      Aftercare Plan  If I am feeling unsafe or I am in a crisis, I will:   Contact my established care providers   Call the National Suicide Prevention Lifeline: 048  Go to the nearest emergency room   Call 491     -I will abstain from all mood altering chemicals not currently prescribed to me       -I will attend scheduled mental health therapy and psychiatric appointments and follow all recommendations      -I will commit to 30 minutes of self care daily - this can be as simple as taking a shower, going for a walk, cooking a meal, read, writing, etc      -I will practice square breathing when I begin to feel anxious - in breath through the nose for the count of 4 and the first line on the square. Out breath through the mouth for the count of 4 for the second line of the square. Repeat to complete the square. Repeat the square as many times as needed.      - I will use distraction skills of: going for walks, watching TV, spending time outside, calling a friend or family member      -Use community resources, including hotline numbers, [facility] crisis and support meetings      -Maintain a daily schedule/routine      -Practice deep breathing skills      -Download a meditation mahendra and spend 15-20 minutes per day mediating/relaxing. Some apps to download include: Calm, Headspace and  "Insight Timer. All 3 of these apps have free version     Your county has a mental health crisis team you can call 24/7: [facility] Mobile Crisis  [phone]     Crisis Lines  Crisis Text Line  Text 837832  You will be connected with a trained live crisis counselor to provide support.  Por espanol, texto  CITLALI a 900700 o texto a 442-AYUDAME en WhatsApp  The [employer] (LGBTQ Youth Crisis Line)  7.668.178.6100  text START to 081-110    Plynked  Fast Tracker  Linking people to mental health and substance use disorder resources  Mobile Ads.Food Sprout   [place] Mental Health Warm Line  Peer to peer support  Monday thru Saturday, 12 pm to 10 pm  140.570.0661 or 5.250.719.4534  Text \"Support\" to 88930  National [place] on Mental Illness (HARSHAL)  574.018.0980 or 1.888.HARSHAL.HELPS    Mental Health Apps  My3  https://[employer].org/  VirtualHopeBox  https://[employer].org/apps/virtual-hope-box/    Additional Information  Today you were seen by a licensed mental health professional through Triage and Transition services, Behavioral Healthcare Providers ([facility])  for a crisis assessment in the Emergency Department at [facility].  It is recommended that you follow up with your established providers (psychiatrist, mental health therapist, and/or primary care doctor - as relevant) as soon as possible. Coordinators from [facility] will be calling you in the next 24-48 hours to ensure that you have the resources you need.  You can also contact [facility] coordinators directly at [phone]. You may have been scheduled for or offered an appointment with a mental health provider. [facility] maintains an extensive network of licensed behavioral health providers to connect patients with the services they need.  We do not charge providers a fee to participate in our referral network.  We match patients with providers based on a patient's specific needs, insurance coverage, and location.  Our first effort will be to refer " you to a provider within your care system, and will utilize providers outside your care system as needed.

## 2023-04-27 NOTE — TELEPHONE ENCOUNTER
[facility] Access Inpatient Bed Call Log 4/27/2023 1:40 AM      Intake has called facilities that have not updated their bed status within the last 12 hours.     Adults:         [facility] is posting 0 beds.      [facility] is posting 0 beds. [phone]      [facility] is posting 0 beds. [phone]     [facility] is posting 0 beds. [phone]     [facility] is posting 0 beds. [phone]     [facility] is posting 0 bed. [phone]      [facility] is posting 0 beds. [phone]     [facility] is posting 0 beds. [phone]     [facility] is posting 0 beds. [phone]     [facility] is posting 0 beds. [phone]       [facility] is posting 3 beds. Mixed unit 12+. Low acuity only.  [phone]     [facility] is posting 0 beds. No aggression.  [phone]     [facility] is posting 0 beds. [phone]      [facility] is posting 1 beds. [phone]      [facility] is posting 0 beds. [phone]      [facility] is posting 1 beds. Low acuity. [phone]     [facility] is posting 1 beds. 72 hr hold preferred. [phone] -     [facility] is posting 0 beds.  [phone]         is posting 0 bed. Voluntary only- no holds/commitments, No Hx of aggression, violence, or assault. No sexual offenders. No 72 hr holds. [phone]     [facility] is posting 6 beds. (Must have the cognitive ability to do programming. No aggressive or violent behavior or recent HX in the last 2 yrs. MH must be primary.) [phone]    [facility] is posting 0 beds. Low acuity only. Violence and aggression capped. [phone]      [facility] is posting 1 beds. Low acuity, Neg Covid. [phone]     [facility] is posting 1 beds. Covid neg. Vol only. Combined adolescent and adult unit. No aggressive or violent behavior. No registered sex offenders. [phone]     [facility] [place] posting 0 beds. Negative covid.      [facility] is posting 10 beds. Call for details. [phone]      [facility] is posting 1 beds. [phone] - 1:46am Per Josefina, they are currently not reviewing due to unit acuity.      Pt remains on work list pending appropriate bed availability.

## 2023-04-27 NOTE — ED NOTES
Pt pleasant and cooperative and has a calm/ bright affect. He is social on the unit and helped another patient order food.Pt endorses SI but affect is bright.

## 2023-04-27 NOTE — ED PROVIDER NOTES
EmPATH Unit - Psychiatric Observation Discharge Summary  [facility] Emergency Department  Discharge Date: 4/27/2023    Honey Eastman MRN: [id number]   Age: 52 year old YOB: 1970     Brief HPI & Initial ED Course     Chief Complaint   Patient presents with     Alcohol Intoxication     HPI  Honey Eastman is a 52 year old male with history notable for schizophrenia further complicated by substance use disorders and homelessness.  He is currently under observation status on the EmPATH unit, now nearing 115 hours in the emergency department.  Overnight, there were no acute issues.  On reassessment today, the patient notes that he is tolerating his medications without side effects.  His mood is euthymic.  He slept well last night.  He has been eating adequately.  Auditory hallucinations are mild and overall improved.  He denied any command hallucinations.  He denied suicidal and homicidal thoughts.  He requested a refill of all of his medications while explaining that he left his backpack on the train and therefore lost access to his medications about 2 weeks prior to his arrival.        Physical Examination   BP: (!) 151/103  Pulse: 65  Temp: 97.8  F (36.6  C)  Resp: 16  Height: 182.9 cm (6')  Weight: 76.7 kg (169 lb 1.6 oz)  SpO2: 98 %    Physical Exam  General: Appears stated age.   Neuro: Alert and fully oriented. Extremities appear to demonstrate normal strength on visual inspection.   Integumentary/Skin: no rash visualized, normal color    Psychiatric Examination   Appearance: awake, alert  Attitude:  cooperative  Eye Contact:  fair  Mood:  better  Affect:  appropriate and in normal range  Speech:  clear, coherent  Psychomotor Behavior:  no evidence of tardive dyskinesia, dystonia, or tics  Thought Process:  logical and linear  Associations:  no loose associations  Thought Content:  no evidence of suicidal ideation or homicidal ideation and no evidence of psychotic thought  Insight:   fair  Judgement:  fair  Oriented to:  time, person, and place  Attention Span and Concentration:  fair  Recent and Remote Memory:  fair  Language: able to name/identify objects without impairment  Fund of Knowledge: intact with awareness of current and past events    Results        Labs Ordered and Resulted from Time of ED Arrival to Time of ED Departure   COMPREHENSIVE METABOLIC PANEL - Abnormal       Result Value    Sodium 139      Potassium 3.9      Chloride 105      Carbon Dioxide (CO2) 24      Anion Gap 10      Urea Nitrogen 6.7      Creatinine 0.85      Calcium 8.4 (*)     Glucose 97      Alkaline Phosphatase 135 (*)     AST 30      ALT 22      Protein Total 6.7      Albumin 3.9      Bilirubin Total 0.2      GFR Estimate >90     ETHYL ALCOHOL LEVEL - Abnormal    Alcohol ethyl 0.33 (*)    CBC WITH PLATELETS AND DIFFERENTIAL - Abnormal    WBC Count 3.1 (*)     RBC Count 4.55      Hemoglobin 13.5      Hematocrit 41.5      MCV 91      MCH 29.7      MCHC 32.5      RDW 13.8      Platelet Count 213      % Neutrophils 45      % Lymphocytes 47      % Monocytes 7      % Eosinophils 1      % Basophils 0      % Immature Granulocytes 0      NRBCs per 100 WBC 0      Absolute Neutrophils 1.4 (*)     Absolute Lymphocytes 1.5      Absolute Monocytes 0.2      Absolute Eosinophils 0.0      Absolute Basophils 0.0      Absolute Immature Granulocytes 0.0      Absolute NRBCs 0.0     COVID-19 VIRUS (CORONAVIRUS) BY PCR - Normal    SARS CoV2 PCR Negative     MAGNESIUM - Normal    Magnesium 1.9     DRUG ABUSE SCREEN 77 URINE (FL, RH, SH) - Normal    Amphetamines Urine Screen Negative      Barbituates Urine Screen Negative      Benzodiazepine Urine Screen Negative      Cannabinoids Urine Screen Negative      Opiates Urine Screen Negative      PCP Urine Screen Negative      Cocaine Urine Screen Negative         Observation Course   The patient was found to have a psychiatric condition that would benefit from an observation stay in the  emergency department for further psychiatric stabilization and/or coordination of a safe disposition. The plan upon observation admission included serial assessments of psychiatric condition, potential administration of medications if indicated, further disposition pending the patient's psychiatric course during the monitoring period.     Serial assessments of the patient's psychiatric condition were performed. Nursing notes were reviewed. During the observation period, the patient did not require medications for agitation, and did not require restraints/seclusion for patient and/or provider safety.     After a period of working with the treatment team on the EmPATH unit, the patient's mental state improved to allow a safe transition to outpatient care. After counseling on the diagnosis, work-up, and treatment plan, the patient was discharged. Close follow-up with a psychiatrist and/or therapist was recommended and community psychiatric resources were provided. Patient is to return to the ED if any urgent or potentially life-threatening concerns.     Discharge Diagnoses:   Final diagnoses:   Schizophrenia (H)   History of traumatic brain injury   Alcohol use disorder   Homeless       Treatment Plan:  -Continue Risperdal and Depakote for mood stabilization and reduction of psychosis  -Referral for outpatient mental health treatment  -Resume Keppra for antiseizure treatment in the context of TBI  -Provide resources for sobriety support and substance use disorder treatments  -There are no crisis beds available for the patient today  -Discharge to a community shelter.      At the time of discharge, the patient's acute suicide risk was determined to be low due to the following factors: Reduction in the intensity of mood/anxiety symptoms that preceded the admission, denial of suicidal thoughts, denies feeling helpless or helpless, not currently under the influence of alcohol or illicit substances, denies experiencing  command hallucinations, no immediate access to firearms. The patient's acute risk could be higher if noncompliant with their treatment plan, medications, follow-up appointments or using illicit substances or alcohol. Protective factors include are limited at this time    I spent more than 30 minutes on discharge day activities.    --  Michael Cervantes MD  [facility] EMERGENCY DEPT  EmPATH Unit  4/27/2023      Michael Cervantes MD  04/27/23 0656

## 2023-05-05 PROBLEM — R53.1 RIGHT SIDED WEAKNESS: Status: ACTIVE | Noted: 2023-01-01

## 2023-05-05 PROBLEM — I63.9 CEREBROVASCULAR ACCIDENT (CVA), UNSPECIFIED MECHANISM (H): Status: ACTIVE | Noted: 2023-01-01

## 2023-05-05 PROBLEM — F10.929 ALCOHOLIC INTOXICATION WITH COMPLICATION (H): Status: ACTIVE | Noted: 2023-01-01

## 2023-05-05 NOTE — ED TRIAGE NOTES
EMS REPORT: [place] [facility] Stated he was having a stroke. EMS states moving all extremities equal. Speech is slurred. Pt  States has had CP for 2 weeks, pt speech slurred, unsteady on feet. Upon arrival patient is stating he is suicidal.      Triage Assessment     Row Name 05/04/23 7732       Triage Assessment (Adult)    Airway WDL WDL       Respiratory WDL    Respiratory WDL WDL       Skin Circulation/Temperature WDL    Skin Circulation/Temperature WDL WDL       Cardiac WDL    Cardiac WDL WDL       Peripheral/Neurovascular WDL    Peripheral Neurovascular WDL WDL       Cognitive/Neuro/Behavioral WDL    Cognitive/Neuro/Behavioral WDL X;speech    Speech slurred

## 2023-05-05 NOTE — PROVIDER NOTIFICATION
"Paged Dr. Isabel, \"pt wants to leave AMA. He has had thoughts in the last 2 weeks of jumping off a bridge but no current plan. no thoughts of hurting anyone else. can he leave ama or should we call a code 21.\"    Per Dr. Isabel, \"he can leave ama\"    AMA Paperwork printed     "

## 2023-05-05 NOTE — ED NOTES
Pt back from CT.  Karuna Gerard RN,.......................................... 5/5/2023   5:25 AM

## 2023-05-05 NOTE — PROGRESS NOTES
RECEIVING UNIT ED HANDOFF REVIEW    ED Nurse Handoff Report was reviewed by: Chinyere Canales RN on May 5, 2023 at 8:34 AM

## 2023-05-05 NOTE — ED NOTES
Assumed care a this time.    Pt very hostile with staff. States that he just wants to sleep.Refusing cares and stating that he will not get an MRI.   Speech is slurred, unsure if d/t alcohol intoxication or if baseline from previous stroke.    /89   Pulse 82   Temp 98.6  F (37  C) (Temporal)   Resp 17   SpO2 98%   Pt agreeable to CT at this time.    MD notified.    Karuna Gerard RN,.......................................... 5/5/2023   3:22 AM

## 2023-05-05 NOTE — ED NOTES
Hand off to patricia NOVA.  Karuna Gerard RN,.......................................... 5/5/2023   7:27 AM

## 2023-05-05 NOTE — ED PROVIDER NOTES
"  History   Chief Complaint:  Alcohol Intoxication, Chest Pain, and Slurred Speech     The history is provided by the EMS personnel, the patient and medical records. The history is limited by the condition of the patient.      Honey Eastman is a 52 year old male with a history of dysphagia due to old stroke, intraparenchymal hemorrhage of brain, HFrEF, nonischemic cardiomyopathy, COPD, hypertension, polysubstance abuse, and schizophrenia who presents via EMS with alcohol intoxication and multiple complaints. According to EMS report, Honey called EMS from the [facility] due to complaints of a stroke. On EMS arrival, the patient was moving all his extremities equally, but his speech was slurred and he was unsteady on his feet. He complained of chest pain for two weeks and suicidal ideation. Here, Honey states he has history of stroke. History is limited due to patient mental status.     Independent Historian:   EMS - They report history as above.    Review of External Notes: I reviewed the patient[place] [facility] [place] earlier today for right foot pain. I reviewed his [facility] [place] visit on 04/30/2023 for multiple complaints. I reviewed his [facility] [place] visit on 12/05/2022; it was noted he had \"grossly symmetric strength in all extremities\".     ROS:  Review of Systems   Unable to perform ROS: Mental status change     Allergies:  Lisinopril  Ace inhibitors     Medications:    Lipitor  Coreg  Depakote  Keppra  Risperdal   Diovan     Past Medical History:    Anxiety disorder  Depressive disorder  Hypertension  Substance abuse   Opioid use disorder  Alcohol use disorder  Drug-induced mood disorder   Hallucinations   Schizophrenia   Traumatic brain injury   Peripheral neuropathy   Prediabetes   Seizure   COPD  Dysphagia due to old cerebrovascular accident   Heart failure with reduced ejection fraction and diastolic dysfunction   Nonischemic cardiomyopathy   Acute " ischemic stroke   Intraparenchymal hemorrhage of brain      Family History:    Father: Substance Abuse     Social History:  The patient arrived to the emergency department via EMS.  The patient was unaccompanied to the emergency department.   reports that he has been smoking. He has been smoking an average of 1.5 packs per day. He has never used smokeless tobacco. He reports current alcohol use. He reports current drug use. Drug: Cocaine.  PCP: No Ref-Primary, Physician     Physical Exam     Patient Vitals for the past 24 hrs:   BP Temp Temp src Pulse Resp SpO2   05/05/23 0500 -- -- -- 84 17 98 %   05/05/23 0400 -- -- -- 76 14 95 %   05/05/23 0300 -- -- -- 75 15 97 %   05/05/23 0239 119/89 -- -- 82 -- 98 %   05/04/23 2300 -- -- -- 92 17 --   05/04/23 2231 -- -- -- 91 15 97 %   05/04/23 2206 -- -- -- 89 18 --   05/04/23 2126 -- -- -- 98 20 97 %   05/04/23 2106 (!) 130/105 98.6  F (37  C) Temporal 98 14 98 %      Physical Exam  General: Well-nourished  Eyes: PERRL, conjunctivae pink no scleral icterus or conjunctival injection  ENT:  Moist mucus membranes, posterior oropharynx clear without erythema or exudates  Respiratory:  Lungs clear to auscultation bilaterally, no crackles/rubs/wheezes.  Good air movement  CV: Normal rate and rhythm, no murmurs/rubs/gallops  GI:  Abdomen soft and non-distended.  Normoactive BS.  No tenderness, guarding or rebound  Skin: Warm, dry.  No rashes or petechiae  Musculoskeletal: No peripheral edema or calf tenderness  Neuro: Somnolent but arouses to loud voice.  Very slurred speech.  Difficulty in cooperating with neurologic examination secondary intoxication.  PERRL, EOMI, no apparent facial droop.  Gait is unsteady.  Seems to be limping or dragging his right leg.  Weakness of the right upper extremity compared to the left.  Weakness of the right lower extremity compared to the left.  Unable to complete remainder of neurologic examination due to patient's intoxication.    Psychiatric:  Intoxicated.  Unable to assess    Emergency Department Course   ECG:  ECG results from 05/04/23   EKG 12 lead     Value    Systolic Blood Pressure     Diastolic Blood Pressure     Ventricular Rate 92    Atrial Rate 92    MN Interval 146    QRS Duration 92        QTc 474    P Axis 46    R AXIS -8    T Axis 37    Interpretation ECG      Sinus rhythm  Inferior infarct , age undetermined  Abnormal ECG  When compared with ECG of 26-APR-2023 18:27,  QT has lengthened  Confirmed by GENERATED REPORT, COMPUTER ([id number]),  Travis Fu ([id number]) on 5/4/2023 9:27:30 PM       Imaging:  Head CT w/o contrast   Preliminary Result   IMPRESSION:   1.  New subtle region of hypoattenuation involving the right romulo measuring 1.6 x 0.8 cm in greatest axial dimensions. This finding may reflect a new region of ischemic injury (possibly late acute versus subacute) versus artifact. If the patient is    experiencing an acute, focal or ongoing neurologic deficit, an MRI may be indicated.   2.  Scattered chronic infarcts, as above.   3.  Chronic senescent and presumed microvascular ischemic changes, as above.                         CTA Head Neck with Contrast   Preliminary Result   IMPRESSION:       HEAD CTA:    1.  No significant stenosis or proximal large vessel occlusion.      NECK CTA:   1.  No significant stenosis or dissection.   2.  Mild atheromatous disease, as above.                           XR Chest 1 View   Final Result   IMPRESSION: Low lung volumes. Heart size prominent on this AP view. Pulmonary vascularity normal. The lungs are clear. No acute infiltrates or effusions.      Report per radiology    Laboratory:  Labs Ordered and Resulted from Time of ED Arrival to Time of ED Departure   DRUG ABUSE SCREEN 77 URINE (FL, RH, SH) - Abnormal       Result Value    Amphetamines Urine Screen Positive (*)     Barbituates Urine Screen Negative      Benzodiazepine Urine Screen Negative      Cannabinoids Urine Screen Negative       Opiates Urine Screen Negative      PCP Urine Screen Negative      Cocaine Urine Screen Positive (*)    COMPREHENSIVE METABOLIC PANEL - Abnormal    Sodium 141      Potassium 4.1      Chloride 103      Carbon Dioxide (CO2) 24      Anion Gap 14      Urea Nitrogen 10.1      Creatinine 0.91      Calcium 8.5 (*)     Glucose 105 (*)     Alkaline Phosphatase 128      AST 50      ALT 27      Protein Total 7.0      Albumin 4.1      Bilirubin Total 0.3      GFR Estimate >90     ETHYL ALCOHOL LEVEL - Abnormal    Alcohol ethyl 0.34 (*)    DRUG ABUSE SCREEN 77 WITH REFLEX TO CONFIRMATORY - Abnormal    Amphetamines Urine Screen Positive (*)     Barbituates Urine Screen Negative      Benzodiazepine Urine Screen Negative      Cannabinoids Urine Screen Negative      Cocaine Urine Screen Positive (*)     Opiates Urine Screen Negative      PCP Urine Screen Negative     CBC WITH PLATELETS AND DIFFERENTIAL - Abnormal    WBC Count 3.6 (*)     RBC Count 4.41      Hemoglobin 13.1 (*)     Hematocrit 40.2      MCV 91      MCH 29.7      MCHC 32.6      RDW 13.7      Platelet Count 252      % Neutrophils 37      % Lymphocytes 54      % Monocytes 7      % Eosinophils 1      % Basophils 1      % Immature Granulocytes 0      NRBCs per 100 WBC 0      Absolute Neutrophils 1.3 (*)     Absolute Lymphocytes 2.0      Absolute Monocytes 0.3      Absolute Eosinophils 0.0      Absolute Basophils 0.0      Absolute Immature Granulocytes 0.0      Absolute NRBCs 0.0     MAGNESIUM - Normal    Magnesium 2.2     TROPONIN T, HIGH SENSITIVITY - Normal    Troponin T, High Sensitivity 17     NT PROBNP INPATIENT - Normal    N terminal Pro BNP Inpatient 91     TROPONIN T, HIGH SENSITIVITY - Normal    Troponin T, High Sensitivity 17     AMPHETAMINE QUANTITATIVE URINE   COCAINE MET, URINE, QUANT      Emergency Department Course & Assessments:    PSS-3    Date and Time Over the past 2 weeks have you felt down, depressed, or hopeless? Over the past 2 weeks have you had  thoughts of killing yourself? Have you ever attempted to kill yourself? When did this last happen? User   05/04/23 2126 yes yes -- -- ZEENAT              Suicide assessment completed by mental health (D.E.C., LCSW, etc.)    Interventions:  Medications   lidocaine 1 % 0.1-1 mL (has no administration in time range)   lidocaine (LMX4) cream (has no administration in time range)   sodium chloride (PF) 0.9% PF flush 3 mL (3 mLs Intracatheter $Given 5/5/23 0500)   sodium chloride (PF) 0.9% PF flush 3 mL (has no administration in time range)   Saline Flush (90 mLs Intravenous $Given 5/5/23 0519)   iopamidol (ISOVUE-370) solution 75 mL (75 mLs Intravenous $Given 5/5/23 0518)      Assessments:  2230 I obtained history and performed an exam of the patient as documented above.    Independent Interpretation (X-rays, CTs, rhythm strip):  I reviewed and interpreted the head CT and see no evidence of intracranial hemorrhage.    Consultations/Discussion of Management or Tests:  I consulted with Dr. Troy regarding admission and plan of care.    Social Determinants of Health affecting care:   Healthcare Access/Compliance, Education/Literacy, Homelessness/Housing Insecurity and Polysubstance abuse.    Disposition:  Care of the patient was transferred to my colleague Dr. David pending disposition.     Impression & Plan    Medical Decision Making:    The patient has stroke symptoms:         ED Stroke specific documentation           NIHSS PDF     Patient last known well time: unknown    Thrombolytics:   Not given due to:   - unclear or unfavorable risk-benefit profile for extended window thrombolysis beyond the conventional 4.5 hour time window    If treating with thrombolytics: Ensure SBP<180 and DBP<105 prior to treatment with thrombolytics.  Administering thrombolytics after treatment with IV labetalol, hydralazine, or nicardipine is reasonable once BP control is established.    Endovascular Retrieval:  Not initiated due to absence  of proximal vessel occlusion    National Institutes of Health Stroke Scale (Baseline)  Time Performed: 2210     Score    Level of consciousness: (2)   Not alert; repeat stim to attend strong stim/pain to move    LOC questions: (2)   Answers neither question correctly    LOC commands: (2)   Performs neither task correctly    Best gaze: (0)   Normal    Visual: (0)   No visual loss    Facial palsy: (0)   Normal symmetrical movements    Motor arm (left): (0)   No drift    Motor arm (right): (1)   Drift    Motor leg (left): (0)   No drift    Motor leg (right): (1)   Drift    Limb ataxia: (2)   Present in two limbs    Sensory: (0)   Normal- no sensory loss    Best language: (1)   Mild to moderate aphasia    Dysarthria: (2)   Severe dysarthria    Extinction and inattention: (0)   No abnormality        Total Score:  13        Stroke Mimics were considered (including migraine headache, seizure disorder, hypoglycemia (or hyperglycemia), head or spinal trauma, CNS infection, Toxin ingestion and shock state (e.g. sepsis) .    Honey Eastman is a 52 year old male who has a history of polysubstance abuse, alcohol abuse and report of strokes and intraparenchymal hemorrhage who comes today with multiple complaints.  He is quite intoxicated upon examination and blood alcohol level confirms this.  His urine tox screen is also positive for multiple substances including cocaine.  This could account for his slurred speech.  He also reports that he is having a stroke or has been having a stroke for what sounds like several weeks, however history seems quite unreliable.  He has a history of a stroke in his records.  I was unable to find a neurologic examination on record that shows right-sided weakness and so had to assume that this is something new.  He would not qualify for thrombolytics given the uncertain history as well as history of head bleed.  Head CT and CTA were finally obtained and did show what could be a new stroke in the  right romulo.  He has significant social determinants of health affecting his health care.  Will admit to the hospitalist for additional evaluation and neurologic consultation.  Dr. Parham graciously agreed to admit the patient.    Diagnosis:    ICD-10-CM    1. Right sided weakness  R53.1       2. Cerebrovascular accident (CVA), unspecified mechanism (H)  I63.9       3. Alcoholic intoxication with complication (H)  F10.929            Discharge Medications:  New Prescriptions    No medications on file      Scribe Disclosure:  I, Vero Nevarez, am serving as a scribe at 10:10 PM on 5/4/2023 to document services personally performed by Kylah Reid MD based on my observations and the provider's statements to me.      Kylah Reid MD  05/05/23 0640       Kylah Reid MD  05/05/23 0642

## 2023-05-05 NOTE — PLAN OF CARE
SLP: Brief SLP evaluation completed with breakfast meal. Baseline dysarthria (NO BASELINE DYSPHAGIA) that pt reported is currently slightly below baseline. Pt self feeding regular textures and trialed multiple pills whole at once with RN. No appreciable dysphagia at this time. Pt denied need for SLP follow up.

## 2023-05-05 NOTE — ED NOTES
Bed: BH3  Expected date:   Expected time:   Means of arrival:   Comments:  HEMS 438 52M etoh - to triage

## 2023-05-05 NOTE — ED NOTES
[facility]  ED Nurse Handoff Report    ED Chief complaint: Alcohol Intoxication, Chest Pain, and Slurred Speech      ED Diagnosis:   Final diagnoses:   Right sided weakness   Cerebrovascular accident (CVA), unspecified mechanism (H)   Alcoholic intoxication with complication (H)       Code Status: Full Code    Allergies:   Allergies   Allergen Reactions     Lisinopril Swelling     Pollen Extract        Patient Story:   Honey Eastman is a 52 year old male with a history of dysphagia due to old stroke, intraparenchymal hemorrhage of brain, HFrEF, nonischemic cardiomyopathy, COPD, hypertension, polysubstance abuse, and schizophrenia who presents via EMS with alcohol intoxication and multiple complaints. According to EMS report, Honey called EMS from the [facility] station due to complaints of a stroke. On EMS arrival, the patient was moving all his extremities equally, but his speech was slurred and he was unsteady on his feet. He complained of chest pain for two weeks and suicidal ideation. Here, Honey states he has history of stroke. History is limited due to patient mental status.     Focused Assessment:    A+Ox3 (Disorientation to time and date) R sided weakness; MARMOLEJO; slurred speech  SBP's 110-130's  >90 %RA    Treatments and/or interventions provided:   Ecg monitoring  Spo2 monitoring    Patient's response to treatments and/or interventions:   No change    To be done/followed up on inpatient unit:        Does this patient have any cognitive concerns?: Disoriented to time    Activity level - Baseline/Home:  Independent  Activity Level - Current:   Unable to assess    Patient's Preferred language: English   Needed?: No    Isolation: Contact   Infection: MRSA  Patient tested for COVID 19 prior to admission: YES  Bariatric?: No    Vital Signs:   Vitals:    05/05/23 0239 05/05/23 0300 05/05/23 0400 05/05/23 0500   BP: 119/89      Pulse: 82 75 76 84   Resp:  15 14 17   Temp:        TempSrc:       SpO2: 98% 97% 95% 98%       Cardiac Rhythm:Cardiac Rhythm: Normal sinus rhythm (HR in the 70s)    Was the PSS-3 completed:   Yes  What interventions are required if any?               Family Comments: none  OBS brochure/video discussed/provided to patient/family: Yes       For the majority of the shift this patient's behavior was Green.   Behavioral interventions performed were Redirection and verbal de-escalation.    ED NURSE PHONE NUMBER: *[id number]

## 2023-05-05 NOTE — PROGRESS NOTES
Pt refused care and left AMA.  He was given the option to see a provider prior to leaving and the patient refused.  Reviewed AMA form with patient.  He did not want medical help at this time and wanted to leave.  Signed AMA form. Pt was given bus tokens and escorted out of the hospital.  Pt had all of his belongings with him.
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
## 2023-05-31 NOTE — ED PROVIDER NOTES
Sign Out Note     Pt accepted in sign out from: Dr. Strauss    Briefly pt presented to the ED for:  Briefly, this is a 52-year-old male with history of cocaine abuse who presents with right-sided chest pain.  Initially, patient was refusing labs, but appeared intoxicated with alcohol.  He has been sobering appropriately ED.  He is finally allowed labs to be drawn.     Plan at time of sign out:  Await results of labs, including troponin.     Care of patient during my shift: Labs reviewed, troponin is negative.  Labs notable for very slightly elevated alkaline phosphatase, nonspecific.  He has chronically low white blood cell count, and mild chronic anemia, these are unchanged from baseline.  7:26 AM  Patient re-evaluated.  He is alert, asking what he should do about his thumb pain.  I reviewed his notes from [facility] from yesterday.  I recommended outpatient follow-up with orthopedics, per recommendations from [facility].  He is currently appropriately splinted in a thumb spica splint.    [facility]:    Xrays showing acute mildly displaced, oblique, intra-articular fracture of the base of the proximal first phalanx and Chronic scaphoid fracture.Pt placed in velcro thumb spica and advised to f/u with ortho.      Plan for patient at this time: Discharge from ED.     MD Lizbeth Jennings Tracy Lynn, MD  05/31/23 0488

## 2023-05-31 NOTE — ED NOTES
Pt is refusing any interventions including IV placement.  Primary RN and MD notified.     Karuna Gerard RN,.......................................... 5/31/2023   12:10 AM

## 2023-05-31 NOTE — ED NOTES
Writer attempted to draw labs and insert PIV for IVF, patient refused. Explained the importance of intervention, still patient refused.

## 2023-05-31 NOTE — ED NOTES
Bed: ED24  Expected date:   Expected time:   Means of arrival:   Comments:  [phone] [id number] chest pain-

## 2023-05-31 NOTE — ED TRIAGE NOTES
Arrived via ems, c/o chest pain and right foot pain. BG pre hospital is 83. Discharged today from [facility] around 900 AM per ems.      Triage Assessment     Row Name 05/30/23 0044       Triage Assessment (Adult)    Airway WDL WDL       Respiratory WDL    Respiratory WDL WDL       Skin Circulation/Temperature WDL    Skin Circulation/Temperature WDL X       Cardiac WDL    Cardiac WDL X;chest pain       Peripheral/Neurovascular WDL    Peripheral Neurovascular WDL X;neurovascular assessment lower       Cognitive/Neuro/Behavioral WDL    Cognitive/Neuro/Behavioral WDL X;mood/behavior       Frnacisco Coma Scale    Best Eye Response 3-->(E3) to speech    Best Motor Response 6-->(M6) obeys commands    Best Verbal Response 4-->(V4) confused  at times    Francisco Coma Scale Score 13

## 2023-06-02 NOTE — ED NOTES
Belongings removed from pt and placed in pt locker.     When attempting to check in the pt, he continues to fall asleep. He stated he did do cocaine. Unable to answer any more questions.

## 2023-06-02 NOTE — ED NOTES
Bed: BH3  Expected date: 6/2/23  Expected time: 12:30 AM  Means of arrival: Ambulance  Comments:  [phone] [id number] intoxicated

## 2023-06-02 NOTE — ED NOTES
Reviewed discharge paperwork w/ pt, pt given 3 bus tokens, pt dressed appropriate for the weather, pt given belongs back, pt given 4 sprites

## 2023-06-02 NOTE — ED TRIAGE NOTES
Pt is from [place] [facility]. Found to be intoxicated. Says he drinks anything and everything. Denies drug use. Pt cooperative and sleepy. Arrived with hospital wrist band on from previous admission.

## 2023-06-02 NOTE — ED NOTES
"Attempted to continue to check the pt in and if he would sit still for a CT. He said \"I don't care. Get away from me\". He answered some questions but other questions he was not answering appropriately.   "

## 2023-06-02 NOTE — ED PROVIDER NOTES
History     Chief Complaint:  Alcohol Intoxication       The history is provided by the patient. History limited by: altered mental state of the patient       Honey Eastman is a 52 year old male with a history of COPD, CHF, hypertension, severe alcohol use disorder, severe opioid use disorder, and TBI who presents with alcohol intoxication, right wrist, back, neck, and head pain. Nursing staff reports that the patient was found in public by the PD and was brought to the ED to be evaluated. The patient reports of head, neck, back, and right wrist pain. The patient's right wrist is currently in a splint. The patient reports that his neck, head, and back pain began a few days ago following a fall. He states that he was drinking tonight. The patient denies doing any other drugs. The patient's history is limited due to the patient's altered mental status.     Independent Historian:   None - Patient Only    Review of External Notes: 5/30/23 at [facility] and was found to have a fracture at the base of his first phalanx and was put linda  Thumb spica splint.    Medications:    Lipitor   Coreg   Depakote ER  Keppra  Risperdal   Diovan     Past Medical History:    Anxiety   CHF  COPD   Depression   History of cerebrovascular accident, resulting in dysphasia   Hypertension   Intraparenchymal hematoma of brain   Left ventricular mural thrombus   Schizophrenia   Seizures   Severe alcohol use disorder   Severe opioid use disorder   TBI   Peripheral neuropathy   Closed fracture of upper end of fibula   Closed displaced fracture of body of right calcaneus with routine healing   Calculus of gallbladder  Insomnia   Edema   Cutaneous Pruritus of Chest Scars    Past Surgical History:    No past surgical history on file.     Physical Exam     Patient Vitals for the past 24 hrs:   BP Temp Temp src Pulse Resp SpO2   06/02/23 0046 (!) 143/102 97.6  F (36.4  C) Temporal 86 16 98 %   06/02/23 0039 -- -- -- -- -- 94 %   06/02/23 0038 (!)  143/102 -- -- 78 -- --        Physical Exam  General: sleeping but wakes easily. Appears clinically intoxicated   Head: The scalp, face, and head appear normal. No signs of trauma   Eyes: The pupils are equal, round, and reactive to light. Conjunctivae and sclerae are normal  Neck: Normal range of motion.   CV: Regular rate and rhythm.   Resp: Lungs are clear without wheezes or rales. No respiratory distress.   GI: Abdomen is soft, no rigidity, guarding, or rebound. No distension. No tenderness to palpation in any quadrant.     MS: thumb spica splint present on the right forearm.Joints grossly normal without effusions. No asymmetric leg swelling, calf or thigh tenderness.    Skin: No rash or lesions noted. Normal capillary refill noted  Neuro: Speech is slurred speech, consistent with alcohol intoxication. Face is symmetric. Moving all extremities.  Normal strength and sensation in all 4 extremities.  Psych:  mild agitation when awake. Denies suicidal ideation.       Emergency Department Course     Imaging:  CT Cervical Spine w/o Contrast   Final Result   IMPRESSION:   1.  No evidence of an acute displaced fracture of the cervical spine.                          CT Head w/o Contrast   Final Result   IMPRESSION:   1.  No CT evidence for acute intracranial process.   2.  Stable chronic changes as above.                              Report per radiology    Laboratory:  Labs Ordered and Resulted from Time of ED Arrival to Time of ED Departure   COMPREHENSIVE METABOLIC PANEL - Abnormal       Result Value    Sodium 142      Potassium 4.0      Chloride 103      Carbon Dioxide (CO2) 25      Anion Gap 14      Urea Nitrogen 11.0      Creatinine 0.87      Calcium 8.2 (*)     Glucose 102 (*)     Alkaline Phosphatase 129      AST 31      ALT 20      Protein Total 6.9      Albumin 3.9      Bilirubin Total 0.2      GFR Estimate >90     ETHYL ALCOHOL LEVEL - Abnormal    Alcohol ethyl 0.30 (*)    CBC WITH PLATELETS AND DIFFERENTIAL  - Abnormal    WBC Count 3.7 (*)     RBC Count 4.17 (*)     Hemoglobin 12.2 (*)     Hematocrit 37.3 (*)     MCV 89      MCH 29.3      MCHC 32.7      RDW 13.2      Platelet Count 220      % Neutrophils 43      % Lymphocytes 49      % Monocytes 6      % Eosinophils 1      % Basophils 1      % Immature Granulocytes 0      NRBCs per 100 WBC 0      Absolute Neutrophils 1.6      Absolute Lymphocytes 1.8      Absolute Monocytes 0.2      Absolute Eosinophils 0.1      Absolute Basophils 0.0      Absolute Immature Granulocytes 0.0      Absolute NRBCs 0.0     LIPASE - Normal    Lipase 34          Emergency Department Course & Assessments:    Consultations/Discussion of Management or Tests:  ED Course as of 06/02/23 0609   Fri Jun 02, 2023   0133 I obtained the patient's history.    0158 I rechecked the patient.        Social Determinants of Health affecting care:   None    Disposition:  Care of the patient was transferred to my colleague Dr. Madrigal pending clinical sobriety.     Impression & Plan      Medical Decision Making:  Patient is a 52-year-old gentleman with past medical history of alcohol abuse who presents to the emergency department with altered mental status likely due to alcohol intoxication.  [place] himself and was transported here by EMS.  On initial evaluation he is sleepy but awakes appropriately.  He admits to alcohol intoxication but denies any further drug use.  Denies any suicidal or homicidal ideations.  Patient reports some headache and neck pain.  On physical exam he does not have any midline cervical spine tenderness.  He is moving all extremities and has normal strength and sensation.  I reviewed recent evaluation at [facility] on 5/30/2023 in which the patient was found to have a proximal phalanx fracture of the right thumb and was placed in a thumb spica splint.  No further advanced imaging was done at that time.  Given the significant level of  intoxication CT of the head and cervical spine was obtained.  Thankfully these were negative for any traumatic pathology.  The remainder of the patient's blood work was reassuring with the exception of his elevated blood alcohol level.  Patient was monitored in our emergency department for several hours and continued to be quite intoxicated.  Will require further time to reach clinical sobriety.  At which time I anticipate discharge.    Diagnosis:    ICD-10-CM    1. Altered mental status, unspecified altered mental status type  R41.82       2. Alcoholic intoxication without complication (H)  F10.920       3. Acute nonintractable headache, unspecified headache type  R51.9       4. Neck pain  M54.2          Scribe Disclosure:  Medina TOLENTINO, am serving as a scribe at 6:22 AM on 6/2/2023 to document services personally performed by Winston Fraser based on my observations and the provider's statements to me.   6/2/2023   Winston Fraser Christopher Joseph, MD  06/02/23 0641

## 2023-06-02 NOTE — ED NOTES
Pt started to get out of bed. This writer attempted to deescalate the pt but he continued to try to walk being very unsteady. He was repeatedly asking for a urinal which another staff member was getting. The pt was not redirectable. Security assisted this writer in getting the pt to sit back on the bed.

## 2023-06-21 NOTE — ED TRIAGE NOTES
[place] stated that he was having a stroke and chest pain and that he wanted an ambulance to pick him up and take him to the hospital. Patient is homeless, appears heavily intoxicated and demanding.      Triage Assessment     Row Name 06/21/23 0126       Triage Assessment (Adult)    Airway WDL WDL       Respiratory WDL    Respiratory WDL WDL       Skin Circulation/Temperature WDL    Skin Circulation/Temperature WDL WDL       Cardiac WDL    Cardiac WDL WDL       Peripheral/Neurovascular WDL    Peripheral Neurovascular WDL WDL       Cognitive/Neuro/Behavioral WDL    Cognitive/Neuro/Behavioral WDL WDL
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
## 2023-06-21 NOTE — ED NOTES
Pt up to urinate. Pt refused to use the urinal and prefer to se the suction canister. Pt cusses frequently at staff. Refused to have vital signs taken.

## 2023-06-21 NOTE — ED NOTES
Pt left bag in room after walking to bus stop w/ security, bag has medications inside, bag given to security so it can be returned to the patient at the bus stop

## 2023-06-21 NOTE — DISCHARGE INSTRUCTIONS
Discharge Instructions  Alcohol Intoxication    You have been seen today with alcohol intoxication. This means that you have enough alcohol in your system to impair your ability to mentally and physically function, perhaps to the extent that you were unable to care for yourself.    Generally, every Emergency Department visit should have a follow-up clinic visit with either a primary or a specialty clinic/provider. Please follow-up as instructed by your emergency provider today.    You may have come to the Emergency Department because of your intoxication, or for another reason, such as because of an injury. No matter what the case is, this visit is a  red flag  regarding alcohol use, and you should consider whether your drinking pattern is a problem for you.     You may be at risk for alcohol-related problems if:    Men: you drink more than 14 drinks per week, or more than 4 drinks per occasion.    Women: you drink more than 7 drinks per week or more than 3 drinks per occasion.    You have black-outs.  You do things you regret while drinking.  You have legal problems because of drinking.  You have job problems because of drinking (you call in sick to work because of drinking).    CAGE Questions  Have you ever felt you should cut down on your drinking?  Have people annoyed you by criticizing your drinking?  Have you ever felt bad or guilty about your drinking?  Have you ever had a drink first thing in the morning to steady your nerves or get rid of a hangover (eye opener)?    If you answer yes to any of the CAGE questions, you may have a problem with alcohol.      Return to the Emergency Department if:  You become shaky or tremble when you try to stop drinking.   You have severe abdominal pain (belly pain).   You have a seizure or pass out.    You vomit (throw up) blood or have blood in your stool. This may be bright red or it may look like black coffee grounds.  You become lightheaded or faint.      For further  help, contact:   Your caregiver.    Alcoholics Anonymous (AA).    [facility] Intergroup: [phone]  [facility] Central Office: [phone]   A drug or alcohol rehabilitation program.    You can get information on alcohol resources and groups by calling the number 211 or [phone] on any phone.     Seek medical care if:  You have persistent vomiting.   You have persistent pain in any part of your body.    You do not feel better after a few days.    If you were given a prescription for medicine here today, be sure to read all of the information (including the package insert) that comes with your prescription.  This will include important information about the medicine, its side effects, and any warnings that you need to know about.  The pharmacist who fills the prescription can provide more information and answer questions you may have about the medicine.  If you have questions or concerns that the pharmacist cannot address, please call or return to the Emergency Department.   Remember that you can always come back to the Emergency Department if you are not able to see your regular doctor in the amount of time listed above, if you get any new symptoms, or if there is anything that worries you.    Substance Use Disorder Direct Access Resources    It is recommended that you abstain from all mood altering chemicals. Please contact the sober support hotline (664-094-6895) as needed; phones are answered 24 hours a day, 7 days a week.    To access substance use treatment you must have a comprehensive assessment completed to begin any treatment program.     If uninsured, please contact your county of residence for eligibility screen to substance use disorder evaluation and treatment:    [place] - [phone]   [place]  [phone]   [facility] [phone]   [place] - [phone]   [facility] [phone]   [place] - [phone]   [facility] [phone]   [place] - [phone]     If you  have private insurance, call the customer service number on the back of your insurance card to find an in-network substance abuse use disorder assessment. The ideal provider will be a treatment facility, licensed in the state of [place].     Community [place] Evaluations: Clients may call their county for a full list of providers - Availability and services listed belo are subject to change, please call the provider to confirm    [facility]  [phone]  [facility] [place], [place], [address]  *Please call the above number to schedule a comprehensive assessment for determination of level of care needs. In person and virtual appointments available Mon-Fri.    [facility] [address], [address], [place] [address] (next to the outpatient lab)    Phone: [phone]   Provides bridging services to people with Opiate Use Disorders (OUD) seeking care. This is a front door to Medication Assisted Treatments (MAT), ages 16+  Walk In hours: Monday-Friday 9:00am-3:00pm    [facility]  [phone]  Walk in Assessments: Mon-Friday 7a-1:45p  [address], [place], [address]    [facility]  Central Access [phone]  [address] [place] [address], [address]  *by appointment only    [facility]  [phone] (phone consultation available )  Locations in: [place], [place], [facility], and [place], [place]  [place] virtual IOP [phone] or visit [email or web address]   Also offers LGBTQ programming     [facility]  [phone]  [address]  [place], [place], [address]  *Currently only offered via telehealth - call to set up an appointment    [facility] Adult Mental [facility]  [address] [place] [address], [address]  [facility]  For more information to to make a referral call:  [phone]  Walk-in on   9-11 a.m.    [facility]  [phone]  [address] [place], [place], [address]  *available by appointments only    [facility]  [phone]  [address] [place], [place], [address]  *available by appointment only    [place]  [phone]  [address] [place], [place], [address]  *walk in assessments available M-F starting at 7 am.    [facility]  [phone]  Locations: [facility], [facility], and [place]  *Walk in assessments availble M-F starting at 8 am -virtual only    [facility]  [phone]  [address] [place] [address]    [facility]  Virtual + Locations: [place], [place], [place], [place], [facility], [facility], [place], [place]   [phone]  *available by appointment only    [facility]  [phone]  [address]  [place], [place], [address]    [facility]  [phone]  [address], [place], [address]  *available by appointment    [facility]  [phone]  [address] [place], [place], [address]  *walk ins available M-TH from 9-3    [facility]  MAT program: [phone]  [address] [place], [place], [address]    [place]  [phone]  Same day substance use disorder assessments are available Monday - Friday, via walk-in or by appointment at the [place] location.  [address], [place] [address]     [facility] - adolescent and adult SUDs services  [phone]  Offer services Monday through Friday, as well as evening hours Monday through Thursday. Normally, a first appointment will be scheduled within one week  [email or web address]  Locations all over [place]    If you are intoxicated, you may be required to detox at a detox facility before starting treatment. The following are detox facilities that you can self present to. All detox  facilities are able to help you complete an assessment prior to discharge if you choose:    [facility]: Arrive at a [facility] for immediate medical evaluation    [facility]: [address] [place], [place], [address].         [phone]    [facility]: [address] [place], [place], [address]  [phone]    [facility] [address] [place], [place], [address]  [phone]     [facility]: [address], [place], [address], [phone]         Ways to help cope with sobriety:    -- Take prescribed medicines as scheduled  -- Keep follow-up appointments  -- Talk to others about your concerns  -- Get regular exercise  -- Practice deep breathing skills  -- Eat a healthy diet  -- Use community resources, including hotline numbers, [facility] crisis and support meetings  -- Stay sober and avoid places/people/things associated with substance use  --Maintain a daily schedule/routine  --Get at least 7-8 hours of sleep per night  --Create a list 10--20 healthy activities that you can do that are enjoyable and do not involve substance use  --Create daily goals (approx. 1-4 goals) per day and work to achieve them throughout the day.       Free Resources:    [place] [facility]  [facility] connects people seeking recovery to resources that help foster and sustain long-term recovery. Whether you are seeking resources for treatment, transportation, housing, job training, education, health care or other pathways to recovery, [facility] is a great place to start.  Phone: 609.468.6342. www.[place]InviteDEV.org (Great listing of all types of recovery and non-recovery related resources)    Alcoholics Anonymous  Phone: 9-157-ALCOHOL  Website: HTTP://WWW.AA.ORG/  AA [place] (807-906-9771 or http://aaminneaqcue.org)  AA [place] (901-439-9178 or www.aastpaul.org)     Narcotics Anonymous  Phone: 590.391.1956  Website:  www.[employer].Write.my.    [facility] [address], [place], [place],  Phone: [phone]  Drop-in Hours: Monday-Friday 9-11:30 am. By appointment at other times.  Provides: [facility] is a drop-in center on the [address] [facility] that provides a safe space for individuals who are homeless and have a history of chemical use. Sobriety is not a requirement but drugs and alcohol are not allowed on the property.  Services: Non-clients can access drop-in services such as Recovery and Harm Reduction Groups, referrals to case management, community activities, shower facilities, and a pool table. Individuals who are homeless and have chemical health needs may be eligible for enrollment into [facility]'s case management program. Clients and  work together to access benefits, treatment, health care, shelter, and external housing resources.